# Patient Record
Sex: MALE | Race: BLACK OR AFRICAN AMERICAN | Employment: OTHER | URBAN - METROPOLITAN AREA
[De-identification: names, ages, dates, MRNs, and addresses within clinical notes are randomized per-mention and may not be internally consistent; named-entity substitution may affect disease eponyms.]

---

## 2018-10-12 ENCOUNTER — HOSPITAL ENCOUNTER (OUTPATIENT)
Facility: HOSPITAL | Age: 83
Setting detail: OBSERVATION
Discharge: HOME | End: 2018-10-13
Attending: EMERGENCY MEDICINE | Admitting: HOSPITALIST
Payer: MEDICARE

## 2018-10-12 ENCOUNTER — APPOINTMENT (OUTPATIENT)
Dept: GENERAL RADIOLOGY | Facility: HOSPITAL | Age: 83
End: 2018-10-12
Attending: EMERGENCY MEDICINE
Payer: MEDICARE

## 2018-10-12 ENCOUNTER — APPOINTMENT (OUTPATIENT)
Dept: CT IMAGING | Facility: HOSPITAL | Age: 83
End: 2018-10-12
Attending: EMERGENCY MEDICINE
Payer: MEDICARE

## 2018-10-12 DIAGNOSIS — R55 SYNCOPE AND COLLAPSE: Primary | ICD-10-CM

## 2018-10-12 PROBLEM — R79.89 AZOTEMIA: Status: ACTIVE | Noted: 2018-10-12

## 2018-10-12 PROBLEM — D69.6 THROMBOCYTOPENIA (HCC): Status: ACTIVE | Noted: 2018-10-12

## 2018-10-12 PROCEDURE — 70450 CT HEAD/BRAIN W/O DYE: CPT | Performed by: EMERGENCY MEDICINE

## 2018-10-12 PROCEDURE — 71045 X-RAY EXAM CHEST 1 VIEW: CPT | Performed by: EMERGENCY MEDICINE

## 2018-10-12 PROCEDURE — 99219 INITIAL OBSERVATION CARE,LEVL II: CPT | Performed by: HOSPITALIST

## 2018-10-12 RX ORDER — ATORVASTATIN CALCIUM 20 MG/1
20 TABLET, FILM COATED ORAL NIGHTLY
Status: DISCONTINUED | OUTPATIENT
Start: 2018-10-12 | End: 2018-10-13

## 2018-10-12 RX ORDER — SENNOSIDES 8.6 MG
650 CAPSULE ORAL EVERY 8 HOURS PRN
COMMUNITY

## 2018-10-12 RX ORDER — ATORVASTATIN CALCIUM 20 MG/1
20 TABLET, FILM COATED ORAL NIGHTLY
COMMUNITY

## 2018-10-12 RX ORDER — ALLOPURINOL 300 MG/1
300 TABLET ORAL DAILY
Status: DISCONTINUED | OUTPATIENT
Start: 2018-10-13 | End: 2018-10-13

## 2018-10-12 RX ORDER — HALOPERIDOL 5 MG/ML
1 INJECTION INTRAMUSCULAR ONCE
Status: DISCONTINUED | OUTPATIENT
Start: 2018-10-12 | End: 2018-10-12

## 2018-10-12 RX ORDER — SODIUM CHLORIDE 9 MG/ML
INJECTION, SOLUTION INTRAVENOUS CONTINUOUS
Status: ACTIVE | OUTPATIENT
Start: 2018-10-12 | End: 2018-10-12

## 2018-10-12 RX ORDER — GABAPENTIN 300 MG/1
300 CAPSULE ORAL NIGHTLY
Status: DISCONTINUED | OUTPATIENT
Start: 2018-10-12 | End: 2018-10-13

## 2018-10-12 RX ORDER — HEPARIN SODIUM 5000 [USP'U]/ML
5000 INJECTION, SOLUTION INTRAVENOUS; SUBCUTANEOUS EVERY 8 HOURS SCHEDULED
Status: DISCONTINUED | OUTPATIENT
Start: 2018-10-12 | End: 2018-10-13

## 2018-10-12 RX ORDER — ALLOPURINOL 300 MG/1
300 TABLET ORAL DAILY
COMMUNITY

## 2018-10-12 RX ORDER — TRAMADOL HYDROCHLORIDE 50 MG/1
50 TABLET ORAL EVERY 6 HOURS PRN
COMMUNITY

## 2018-10-12 RX ORDER — DEXTROSE MONOHYDRATE 25 G/50ML
50 INJECTION, SOLUTION INTRAVENOUS
Status: DISCONTINUED | OUTPATIENT
Start: 2018-10-12 | End: 2018-10-13

## 2018-10-12 RX ORDER — ASPIRIN 81 MG/1
81 TABLET, CHEWABLE ORAL DAILY
COMMUNITY

## 2018-10-12 RX ORDER — LORAZEPAM 2 MG/ML
0.5 INJECTION INTRAMUSCULAR ONCE
Status: COMPLETED | OUTPATIENT
Start: 2018-10-12 | End: 2018-10-12

## 2018-10-12 RX ORDER — TRAMADOL HYDROCHLORIDE 50 MG/1
50 TABLET ORAL EVERY 6 HOURS PRN
Status: DISCONTINUED | OUTPATIENT
Start: 2018-10-12 | End: 2018-10-13

## 2018-10-12 RX ORDER — MELATONIN
250 DAILY
Status: DISCONTINUED | OUTPATIENT
Start: 2018-10-12 | End: 2018-10-13

## 2018-10-12 RX ORDER — DONEPEZIL HYDROCHLORIDE 5 MG/1
5 TABLET, FILM COATED ORAL NIGHTLY
Status: DISCONTINUED | OUTPATIENT
Start: 2018-10-12 | End: 2018-10-13

## 2018-10-12 RX ORDER — MELATONIN
100 DAILY
Status: DISCONTINUED | OUTPATIENT
Start: 2018-10-12 | End: 2018-10-13

## 2018-10-12 RX ORDER — HALOPERIDOL 5 MG/ML
1 INJECTION INTRAMUSCULAR ONCE
Status: COMPLETED | OUTPATIENT
Start: 2018-10-12 | End: 2018-10-12

## 2018-10-12 RX ORDER — OLMESARTAN MEDOXOMIL AND HYDROCHLOROTHIAZIDE 40/12.5 40; 12.5 MG/1; MG/1
TABLET ORAL DAILY
Status: DISCONTINUED | OUTPATIENT
Start: 2018-10-12 | End: 2018-10-12 | Stop reason: ALTCHOICE

## 2018-10-12 RX ORDER — ONDANSETRON 2 MG/ML
4 INJECTION INTRAMUSCULAR; INTRAVENOUS EVERY 4 HOURS PRN
Status: DISCONTINUED | OUTPATIENT
Start: 2018-10-12 | End: 2018-10-12

## 2018-10-12 RX ORDER — QUETIAPINE 25 MG/1
25 TABLET, FILM COATED ORAL NIGHTLY PRN
Status: DISCONTINUED | OUTPATIENT
Start: 2018-10-12 | End: 2018-10-13

## 2018-10-12 RX ORDER — HALOPERIDOL 5 MG/ML
2 INJECTION INTRAMUSCULAR EVERY 6 HOURS PRN
Status: DISCONTINUED | OUTPATIENT
Start: 2018-10-12 | End: 2018-10-13

## 2018-10-12 RX ORDER — GABAPENTIN 300 MG/1
300 CAPSULE ORAL NIGHTLY
COMMUNITY

## 2018-10-12 RX ORDER — CHOLECALCIFEROL (VITAMIN D3) 125 MCG
500 CAPSULE ORAL DAILY
Status: DISCONTINUED | OUTPATIENT
Start: 2018-10-12 | End: 2018-10-13

## 2018-10-12 RX ORDER — SODIUM CHLORIDE 9 MG/ML
INJECTION, SOLUTION INTRAVENOUS ONCE
Status: COMPLETED | OUTPATIENT
Start: 2018-10-12 | End: 2018-10-12

## 2018-10-12 RX ORDER — DONEPEZIL HYDROCHLORIDE 5 MG/1
5 TABLET, FILM COATED ORAL NIGHTLY
COMMUNITY

## 2018-10-12 NOTE — PROGRESS NOTES
Received from ER into room 2210. Family at bedside. Oriented to CTU but has severe dementia and only oriented to name. Call light and phone in reach. Bed locked and in low position. Bed alarm on.

## 2018-10-12 NOTE — H&P
[facility]  History and Physical     Helena Ciara Patient Status:  Emergency    10/7/1935 MRN [id number]   Location [facility] Attending Jarod Cage MD   Hosp Day # 0 PCP Gen Butler MD     Chief Complaint: sync needed for Pain. Disp:  Rfl:    Donepezil HCl 5 MG Oral Tab Take 5 mg by mouth nightly. Disp:  Rfl:    Acetaminophen ER (TYLENOL 8 HOUR ARTHRITIS PAIN) 650 MG Oral Tab CR Take 650 mg by mouth every 8 (eight) hours as needed for Pain.  Disp:  Rfl:    gabapen 28*   CREATSERUM  1.18   GFRAA  66   GFRNAA  57*   CA  8.8   ALB  3.4   NA  139   K  4.7   CL  105   CO2  28.0   ALKPHO  108   AST  41   ALT  21   BILT  0.5   TP  7.2       Estimated Creatinine Clearance: 52.1 mL/min (based on SCr of 1.18 mg/dL).     Recent

## 2018-10-12 NOTE — ED PROVIDER NOTES
Patient Seen in: [facility] Emergency Department    History   Patient presents with:  Syncope (cardiovascular, neurologic)    Stated Complaint: syncope    HPI    Patient is a 80-year-old male with a history of diabetes, hypertension and high cholest Exam  GENERAL: Well-developed, well-nourished male sitting up breathing easily in no apparent distress. Patient is nontoxic in appearance. HEENT: Head is normocephalic, atraumatic. Pupils are 4 mm equally round and reactive to light. Oropharynx is clear. ---------                               -----------         ------                     CBC W/ DIFFERENTIAL[[id number]]          Abnormal            Final result                 Please view results for these tests on the individual orders.    SNIDER Dr. Asim Gustasfon was notified from the ER and the patient was admitted for further care at this time.           Disposition and Plan     Clinical Impression:  Syncope and collapse  (primary encounter diagnosis)    Disposition:  Admit  10/12/2018  2:47 pm    Follow

## 2018-10-12 NOTE — ED INITIAL ASSESSMENT (HPI)
Patient to ED by EMS for syncopal episode while at home today. Per EMS, patient was assisted to floor by family. Patient with hx of Dementia. Denies any complaints at this time.

## 2018-10-13 ENCOUNTER — APPOINTMENT (OUTPATIENT)
Dept: CV DIAGNOSTICS | Facility: HOSPITAL | Age: 83
End: 2018-10-13
Attending: HOSPITALIST
Payer: MEDICARE

## 2018-10-13 VITALS
DIASTOLIC BLOOD PRESSURE: 65 MMHG | HEART RATE: 71 BPM | RESPIRATION RATE: 18 BRPM | HEIGHT: 72 IN | SYSTOLIC BLOOD PRESSURE: 135 MMHG | BODY MASS INDEX: 26.95 KG/M2 | WEIGHT: 199 LBS | OXYGEN SATURATION: 99 % | TEMPERATURE: 98 F

## 2018-10-13 PROCEDURE — 99217 OBSERVATION CARE DISCHARGE: CPT | Performed by: HOSPITALIST

## 2018-10-13 PROCEDURE — 99204 OFFICE O/P NEW MOD 45 MIN: CPT | Performed by: OTHER

## 2018-10-13 PROCEDURE — 93306 TTE W/DOPPLER COMPLETE: CPT | Performed by: HOSPITALIST

## 2018-10-13 NOTE — CONSULTS
[facility]  Report of Consultation    Fern Bill Patient Status:  Observation    10/7/1935 MRN [id number]   [facility] 2NE-A Attending Hunter Julien MD   Hosp Day # 0 PCP Nestor Vogel MD     Reason for Consultation:  Fall with loss of (diabetes mellitus) ([facility] [address]    • Gout    • High blood pressure    • High cholesterol    • HTN (hypertension)    • Hypercholesterolemia    • Muscle weakness    • Visual impairment      History reviewed. No pertinent surgical history.   Family History   Problem 20 mg, Oral, Nightly  •  Donepezil HCl (ARICEPT) tab 5 mg, 5 mg, Oral, Nightly  •  gabapentin (NEURONTIN) cap 300 mg, 300 mg, Oral, Nightly  •  vitamin B-6 (pyridoxine) tab 250 mg, 250 mg, Oral, Daily  •  TraMADol HCl (ULTRAM) tab 50 mg, 50 mg, Oral, Q6H P 12 98 % — —   10/12/18 1150 128/64 97.4 °F (36.3 °C) Temporal 62 16 95 % 72\" 199 lb       Gen: well developed, well nourished, no acute distress  HEENT: normocephalic  Heart; normal E3/G1, regular rate and rhythm  Lungs: clear to auscultation bilaterally intact to pin throughout  Coord: FNF and HKS intact with minimal tremor, but no dysmetria, although he requires cues to perform this task.   He is, however able to do this, without significant ataxia; rapid alternating movements intact bilaterally  Romberg: instability as an underlying progression of chronic neurodegenerative process. He is already on cholinesterase inhibitor therapy, and has appointment with outpatient neurologist 10/22/18, at [facility], per chart review.     From what I can tell, patient blankclaude to follow up as outpatient as noted above - family aware of plan and will discuss with ancillary services.      Dario Atkins MD, Neurology  [facility]  Pager [phone]  10/13/2018

## 2018-10-13 NOTE — PROGRESS NOTES
Spoke with Dr. Scott Brennan who will see pt tomorrow morning for neurology consult.  Family plans to be at bedside per his request.

## 2018-10-13 NOTE — PLAN OF CARE
PATIENT VERY CONFUSED AND AGITATED AND PULLED IV AND MONITOR OUT AND REFUSED TO STAY IN BED AND COMBATIVE. PATIENT KICK RN 2X.  CODE SUPPORT CALLED 2X AND DR. Nahun Martinez NOTIFIED AND HALDOL 1 MG GIVEN IV AS ORDERED AND VEST AND WRIST RESTRAINT APPLIED BUT AFTER

## 2018-10-13 NOTE — PLAN OF CARE
Problem: CARDIOVASCULAR - ADULT  Goal: Maintains optimal cardiac output and hemodynamic stability  INTERVENTIONS:  - Monitor vital signs, rhythm, and trends  - Monitor for bleeding, hypotension and signs of decreased cardiac output  - Evaluate effectivenes hydration, nutrition and elimination needs   - Reorient and redirection as needed  - Assess for the need to continue restraints  Outcome: Progressing

## 2018-10-13 NOTE — CM/SW NOTE
Spoke with sister Kristopher Bravo re: d/c planning needs, patient/family decline hh services. She was interested in caregiver resources through the [facility] and even becoming a paid caregiver, left message with [employer] for additional information.

## 2018-10-13 NOTE — PHYSICAL THERAPY NOTE
Attempted to evaluate pt for physical therapy at this time, but the pt is hypoglycemic (Blood sugar=60). Will plan to re-attempt to evaluate pt when blood sugar is therapeutic.

## 2018-10-13 NOTE — PROGRESS NOTES
Discharge instructions reviewed with pt and family. Given AVS, no new prescriptions. Transport here to take pt to [facility]. Cesar Fredi for pt sent with pt and family.

## 2018-10-13 NOTE — PROGRESS NOTES
Dr. Linda Penn of 67 at 1230, treatment per protocol and repeat BS of 91 followed by meal. Epic is uncooperative.

## 2018-10-13 NOTE — PHYSICAL THERAPY NOTE
PHYSICAL THERAPY QUICK EVALUATION - INPATIENT    Room Number: 0441/0157-O  Evaluation Date: 10/13/2018  Presenting Problem: Syncope and Collapse  Physician Order: PT Eval and Treat    Problem List  Principal Problem:    Syncope and collapse  Active Probl R Elbow Flexion: 5/5   R Elbow Extension: 5/5   R Hand : 5/5    LUE ASSESSMENT   LUE AROM  L Shoulder Flexion: WFL - Within Functional Limits   L Elbow Flexion/Extension: WFL - Within Functional Limits   L Wrist Flexion: WFL - Within Functional Limit wheelchair, bedside commode, etc.): A Little   -   Moving from lying on back to sitting on the side of the bed?: A Little   How much help from another person does the patient currently need. ..   -   Moving to and from a bed to a chair (including a wheelcha for falls. Despite these impairments the pt is functioning at supervision level and is thus deemed safe for discharge home with 24 hour care, which the pt typically has from family.   Pt was issued a RW for home use this session for additional support with

## 2018-10-13 NOTE — PLAN OF CARE
Problem: CARDIOVASCULAR - ADULT  Goal: Maintains optimal cardiac output and hemodynamic stability  INTERVENTIONS:  - Monitor vital signs, rhythm, and trends  - Monitor for bleeding, hypotension and signs of decreased cardiac output  - Evaluate effectivenes condition, hydration, nutrition and elimination needs   - Reorient and redirection as needed  - Assess for the need to continue restraints  Outcome: Not Progressing

## 2018-10-13 NOTE — DISCHARGE SUMMARY
[facility]  DISCHARGE SUMMARY     Keaton Quarles Patient Status:  Observation    10/7/1935 MRN [id number]   [facility] 2NE-A Attending Nikki Thornton MD   Hosp Day # 0 PCP Fifi Tafoya MD     Date of Admission: 10/12/2018  Date of Disch Tabs  Commonly known as:  ARICEPT      Take 5 mg by mouth nightly. Refills:  0     gabapentin 300 MG Caps  Commonly known as:  NEURONTIN      Take 300 mg by mouth nightly. Refills:  0     OSTEO BI-FLEX JOINT SHIELD OR      Take 1 tablet by mouth daily.

## 2018-10-13 NOTE — PROGRESS NOTES
I have been informed by RN that patient has remained very agitated, tachy, refusing all oral meds despite 2mg of haldol IV x2. Will try a small dose of Ativan and monitor closely.      Kerline Urrutia MD  [facility]

## 2019-01-01 ENCOUNTER — APPOINTMENT (OUTPATIENT)
Dept: GENERAL RADIOLOGY | Facility: HOSPITAL | Age: 84
DRG: 683 | End: 2019-01-01
Attending: STUDENT IN AN ORGANIZED HEALTH CARE EDUCATION/TRAINING PROGRAM
Payer: MEDICARE

## 2019-01-01 ENCOUNTER — HOSPITAL ENCOUNTER (INPATIENT)
Facility: HOSPITAL | Age: 84
LOS: 5 days | Discharge: NURSING, REHAB OR LONG-TERM CARE | DRG: 683 | End: 2019-01-01
Attending: EMERGENCY MEDICINE | Admitting: HOSPITALIST
Payer: MEDICARE

## 2019-01-01 VITALS
HEIGHT: 75 IN | OXYGEN SATURATION: 91 % | RESPIRATION RATE: 20 BRPM | HEART RATE: 82 BPM | WEIGHT: 182.38 LBS | DIASTOLIC BLOOD PRESSURE: 52 MMHG | BODY MASS INDEX: 22.68 KG/M2 | TEMPERATURE: 99 F | SYSTOLIC BLOOD PRESSURE: 115 MMHG

## 2019-01-01 DIAGNOSIS — E86.0 DEHYDRATION: Primary | ICD-10-CM

## 2019-01-01 DIAGNOSIS — R19.7 DIARRHEA, UNSPECIFIED TYPE: ICD-10-CM

## 2019-01-01 DIAGNOSIS — N17.9 ACUTE RENAL FAILURE, UNSPECIFIED ACUTE RENAL FAILURE TYPE (HCC): ICD-10-CM

## 2019-01-01 PROCEDURE — 73560 X-RAY EXAM OF KNEE 1 OR 2: CPT | Performed by: STUDENT IN AN ORGANIZED HEALTH CARE EDUCATION/TRAINING PROGRAM

## 2019-01-01 PROCEDURE — 99232 SBSQ HOSP IP/OBS MODERATE 35: CPT | Performed by: HOSPITALIST

## 2019-01-01 PROCEDURE — 99239 HOSP IP/OBS DSCHRG MGMT >30: CPT | Performed by: HOSPITALIST

## 2019-01-01 PROCEDURE — 99223 1ST HOSP IP/OBS HIGH 75: CPT | Performed by: STUDENT IN AN ORGANIZED HEALTH CARE EDUCATION/TRAINING PROGRAM

## 2019-01-01 RX ORDER — DONEPEZIL HYDROCHLORIDE 5 MG/1
5 TABLET, FILM COATED ORAL NIGHTLY
Status: DISCONTINUED | OUTPATIENT
Start: 2019-01-01 | End: 2019-01-01

## 2019-01-01 RX ORDER — LOPERAMIDE HYDROCHLORIDE 2 MG/1
2 CAPSULE ORAL 4 TIMES DAILY PRN
Qty: 30 CAPSULE | Refills: 0 | Status: SHIPPED | OUTPATIENT
Start: 2019-01-01 | End: 2019-01-01

## 2019-01-01 RX ORDER — SODIUM CHLORIDE 9 MG/ML
INJECTION, SOLUTION INTRAVENOUS CONTINUOUS
Status: DISCONTINUED | OUTPATIENT
Start: 2019-01-01 | End: 2019-01-01

## 2019-01-01 RX ORDER — HALOPERIDOL 5 MG/ML
2 INJECTION INTRAMUSCULAR EVERY 4 HOURS PRN
Status: DISCONTINUED | OUTPATIENT
Start: 2019-01-01 | End: 2019-01-01

## 2019-01-01 RX ORDER — ATORVASTATIN CALCIUM 20 MG/1
20 TABLET, FILM COATED ORAL NIGHTLY
Status: DISCONTINUED | OUTPATIENT
Start: 2019-01-01 | End: 2019-01-01

## 2019-01-01 RX ORDER — LOPERAMIDE HYDROCHLORIDE 2 MG/1
2 CAPSULE ORAL 4 TIMES DAILY PRN
Qty: 30 CAPSULE | Refills: 0 | Status: SHIPPED | OUTPATIENT
Start: 2019-01-01

## 2019-01-01 RX ORDER — ASPIRIN 81 MG/1
81 TABLET, CHEWABLE ORAL DAILY
Status: DISCONTINUED | OUTPATIENT
Start: 2019-01-01 | End: 2019-01-01

## 2019-01-01 RX ORDER — POTASSIUM CHLORIDE 20 MEQ/1
40 TABLET, EXTENDED RELEASE ORAL ONCE
Status: COMPLETED | OUTPATIENT
Start: 2019-01-01 | End: 2019-01-01

## 2019-01-01 RX ORDER — HALOPERIDOL 2 MG/1
2 TABLET ORAL EVERY 6 HOURS PRN
Status: DISCONTINUED | OUTPATIENT
Start: 2019-01-01 | End: 2019-01-01

## 2019-01-01 RX ORDER — HEPARIN SODIUM 5000 [USP'U]/ML
5000 INJECTION, SOLUTION INTRAVENOUS; SUBCUTANEOUS EVERY 12 HOURS SCHEDULED
Status: DISCONTINUED | OUTPATIENT
Start: 2019-01-01 | End: 2019-01-01

## 2019-01-01 RX ORDER — METOCLOPRAMIDE HYDROCHLORIDE 5 MG/ML
10 INJECTION INTRAMUSCULAR; INTRAVENOUS EVERY 8 HOURS PRN
Status: DISCONTINUED | OUTPATIENT
Start: 2019-01-01 | End: 2019-01-01

## 2019-01-01 RX ORDER — POTASSIUM CHLORIDE 1.5 G/1.77G
40 POWDER, FOR SOLUTION ORAL ONCE
Status: COMPLETED | OUTPATIENT
Start: 2019-01-01 | End: 2019-01-01

## 2019-01-01 RX ORDER — ONDANSETRON 2 MG/ML
4 INJECTION INTRAMUSCULAR; INTRAVENOUS EVERY 4 HOURS PRN
Status: DISCONTINUED | OUTPATIENT
Start: 2019-01-01 | End: 2019-01-01 | Stop reason: HOSPADM

## 2019-01-01 RX ORDER — ONDANSETRON 2 MG/ML
4 INJECTION INTRAMUSCULAR; INTRAVENOUS EVERY 6 HOURS PRN
Status: DISCONTINUED | OUTPATIENT
Start: 2019-01-01 | End: 2019-01-01

## 2019-01-01 RX ORDER — HALOPERIDOL 5 MG/ML
5 INJECTION INTRAMUSCULAR EVERY 4 HOURS PRN
Status: DISCONTINUED | OUTPATIENT
Start: 2019-01-01 | End: 2019-01-01

## 2019-01-01 RX ORDER — TRAZODONE HYDROCHLORIDE 50 MG/1
50 TABLET ORAL NIGHTLY PRN
Status: DISCONTINUED | OUTPATIENT
Start: 2019-01-01 | End: 2019-01-01

## 2019-01-01 RX ORDER — LOPERAMIDE HYDROCHLORIDE 2 MG/1
2 CAPSULE ORAL 4 TIMES DAILY PRN
Status: DISCONTINUED | OUTPATIENT
Start: 2019-01-01 | End: 2019-01-01

## 2019-01-01 RX ORDER — ONDANSETRON 2 MG/ML
4 INJECTION INTRAMUSCULAR; INTRAVENOUS ONCE
Status: COMPLETED | OUTPATIENT
Start: 2019-01-01 | End: 2019-01-01

## 2019-01-01 RX ORDER — GABAPENTIN 300 MG/1
300 CAPSULE ORAL NIGHTLY
Status: DISCONTINUED | OUTPATIENT
Start: 2019-01-01 | End: 2019-01-01

## 2019-01-01 RX ORDER — SODIUM CHLORIDE 9 MG/ML
INJECTION, SOLUTION INTRAVENOUS CONTINUOUS
Status: ACTIVE | OUTPATIENT
Start: 2019-01-01 | End: 2019-01-01

## 2019-01-01 RX ORDER — ALLOPURINOL 300 MG/1
300 TABLET ORAL DAILY
Status: DISCONTINUED | OUTPATIENT
Start: 2019-01-01 | End: 2019-01-01

## 2019-01-01 RX ORDER — ESCITALOPRAM OXALATE 10 MG/1
10 TABLET ORAL DAILY
Status: DISCONTINUED | OUTPATIENT
Start: 2019-01-01 | End: 2019-01-01

## 2019-01-01 RX ORDER — POTASSIUM CHLORIDE 1.5 G/1.77G
40 POWDER, FOR SOLUTION ORAL EVERY 4 HOURS
Status: COMPLETED | OUTPATIENT
Start: 2019-01-01 | End: 2019-01-01

## 2019-01-01 RX ORDER — ACETAMINOPHEN 325 MG/1
650 TABLET ORAL EVERY 6 HOURS PRN
Status: DISCONTINUED | OUTPATIENT
Start: 2019-01-01 | End: 2019-01-01

## 2019-07-30 ENCOUNTER — APPOINTMENT (OUTPATIENT)
Dept: GENERAL RADIOLOGY | Facility: HOSPITAL | Age: 84
End: 2019-07-30
Attending: EMERGENCY MEDICINE
Payer: MEDICARE

## 2019-07-30 ENCOUNTER — APPOINTMENT (OUTPATIENT)
Dept: CT IMAGING | Facility: HOSPITAL | Age: 84
End: 2019-07-30
Attending: EMERGENCY MEDICINE
Payer: MEDICARE

## 2019-07-30 ENCOUNTER — HOSPITAL ENCOUNTER (EMERGENCY)
Facility: HOSPITAL | Age: 84
Discharge: HOME | End: 2019-07-30
Attending: EMERGENCY MEDICINE
Payer: MEDICARE

## 2019-07-30 VITALS
TEMPERATURE: 98 F | DIASTOLIC BLOOD PRESSURE: 68 MMHG | HEART RATE: 57 BPM | BODY MASS INDEX: 26 KG/M2 | OXYGEN SATURATION: 94 % | RESPIRATION RATE: 18 BRPM | SYSTOLIC BLOOD PRESSURE: 161 MMHG | WEIGHT: 190 LBS

## 2019-07-30 DIAGNOSIS — S80.212A ABRASION OF KNEE, BILATERAL: Primary | ICD-10-CM
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
## 2019-07-30 DIAGNOSIS — S80.211A ABRASION OF KNEE, BILATERAL: Primary | ICD-10-CM
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
## 2019-07-30 PROCEDURE — 99284 EMERGENCY DEPT VISIT MOD MDM: CPT

## 2019-07-30 PROCEDURE — 73560 X-RAY EXAM OF KNEE 1 OR 2: CPT | Performed by: EMERGENCY MEDICINE

## 2019-07-30 PROCEDURE — 72125 CT NECK SPINE W/O DYE: CPT | Performed by: EMERGENCY MEDICINE

## 2019-07-30 PROCEDURE — 70450 CT HEAD/BRAIN W/O DYE: CPT | Performed by: EMERGENCY MEDICINE

## 2019-07-30 PROCEDURE — 73030 X-RAY EXAM OF SHOULDER: CPT | Performed by: EMERGENCY MEDICINE

## 2019-07-30 RX ORDER — TRAZODONE HYDROCHLORIDE 50 MG/1
TABLET ORAL
COMMUNITY
Start: 2019-07-12

## 2019-07-30 RX ORDER — CITALOPRAM 20 MG/1
20 TABLET ORAL
COMMUNITY
Start: 2019-07-14
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
## 2019-07-30 NOTE — ED INITIAL ASSESSMENT (HPI)
Pt here due to a fall. Pt's wife heard him fall in the bathroom and she found him face down in the bathroom with urine on the floor. Pt is A0X1 which is his norm. He does not remember the fall at all.

## 2019-11-06 ENCOUNTER — APPOINTMENT (OUTPATIENT)
Dept: GENERAL RADIOLOGY | Facility: HOSPITAL | Age: 84
End: 2019-11-06
Attending: EMERGENCY MEDICINE
Payer: MEDICARE

## 2019-11-06 ENCOUNTER — HOSPITAL ENCOUNTER (EMERGENCY)
Facility: HOSPITAL | Age: 84
Discharge: HOME | End: 2019-11-06
Attending: EMERGENCY MEDICINE
Payer: MEDICARE

## 2019-11-06 VITALS
BODY MASS INDEX: 23.5 KG/M2 | TEMPERATURE: 98 F | HEIGHT: 75 IN | SYSTOLIC BLOOD PRESSURE: 122 MMHG | OXYGEN SATURATION: 97 % | DIASTOLIC BLOOD PRESSURE: 88 MMHG | HEART RATE: 88 BPM | RESPIRATION RATE: 18 BRPM | WEIGHT: 189 LBS

## 2019-11-06 DIAGNOSIS — K59.00 CONSTIPATION, UNSPECIFIED CONSTIPATION TYPE: Primary | ICD-10-CM

## 2019-11-06 PROCEDURE — 99284 EMERGENCY DEPT VISIT MOD MDM: CPT

## 2019-11-06 PROCEDURE — 74019 RADEX ABDOMEN 2 VIEWS: CPT | Performed by: EMERGENCY MEDICINE

## 2019-11-06 RX ORDER — MAGNESIUM CARB/ALUMINUM HYDROX 105-160MG
296 TABLET,CHEWABLE ORAL ONCE
Qty: 296 ML | Refills: 0 | Status: SHIPPED | OUTPATIENT
Start: 2019-11-06 | End: 2019-11-06

## 2019-11-06 NOTE — ED INITIAL ASSESSMENT (HPI)
Alert x1-2 per baseline, lives with sister and noticed pt grimacing when sitting in toilet with NO BM now x2 days, per sister, when pt wipes, fecal matter noted but no BM.

## 2019-11-06 NOTE — ED PROVIDER NOTES
Patient Seen in: [facility] Emergency Department      History   Patient presents with:  Anal Problem (GI)    Stated Complaint: rectal pain, denies bleeding or dark stool.  States some bowel incontinence    HPI    28-year-old male presents emergency r distress. HEENT:  no scleral icterus. Mucous membranes are moist  HEART: Regular rate and rhythm, no murmurs. LUNGS: Clear to auscultation bilaterally. No Rales, no rhonchi, no wheezing, no stridor.   ABDOMEN: Soft, nondistended,non tender  Rectal exam:

## 2019-12-11 PROBLEM — E86.0 DEHYDRATION: Status: ACTIVE | Noted: 2019-01-01

## 2019-12-11 PROBLEM — R19.7 DIARRHEA, UNSPECIFIED TYPE: Status: ACTIVE | Noted: 2019-01-01

## 2019-12-11 PROBLEM — N17.9 ACUTE RENAL FAILURE, UNSPECIFIED ACUTE RENAL FAILURE TYPE (HCC): Status: ACTIVE | Noted: 2019-01-01

## 2019-12-11 NOTE — ED NOTES
Incontinent of a large amount of liquid brown stool. Unable to obtain any for specimen. Repositioned in bed.

## 2019-12-11 NOTE — ED INITIAL ASSESSMENT (HPI)
Patient here with wife who reports nausea/vomiting yesterday, resolved today but developed diarrhea. Reports 6 or 7 episodes. Patient denies any abd pain. Denies fever/chills.

## 2019-12-11 NOTE — ED PROVIDER NOTES
Patient Seen in: [facility] Emergency Department      History   Patient presents with:  Nausea/Vomiting/Diarrhea    Stated Complaint: diarrhea since 530 am, cough, no fever.  no abd pain    HPI    80-year-old male with history of diabetes, hypertensi 97.3 °F (36.3 °C) (Temporal)   Resp 18   Ht 190.5 cm (6' 3\")   Wt 89.8 kg   SpO2 99%   BMI 24.75 kg/m²         Physical Exam    GENERAL: Patient is awake, alert,  in no acute distress. HEENT:  no scleral icterus.   Mucous membranes are moderately dry, true CULTURE REFLEX   RAINBOW DRAW BLUE   RAINBOW DRAW LAVENDER   RAINBOW DRAW LIGHT GREEN   RAINBOW DRAW GOLD   C. DIFFICILE(TOXIGENIC)PCR   OCCULT BLOOD, STOOL   STOOL CULTURE W/SHIGATOXIN                  MDM     Patient IV established, blood work obtained.

## 2019-12-12 NOTE — PROGRESS NOTES
[facility]  Progress Note     Maegan Salgado Patient Status:  Inpatient    10/7/1935 MRN [id number]   [facility] 5NW-A Attending Trice Fontaine MD   Hosp Day # 1 PCP El Tanner MD     Chief Complaint: n/v/d  S:  Agitated overnigh per day     ASSESSMENT / PLAN:   1. Diarrhea- C dif neg  1. Stool studies pending   2. IVF  3. Supportive care  4. Imodium PRN   2. Right knee pain- Xray neg. Pt w/ h/o gout  1. On allopurinol  2. Start prednisone short burst   3. SHAMIKA- improving  1.  IVF  2

## 2019-12-12 NOTE — PLAN OF CARE
Admission navigator completed by admission RN. Admitted in er. Report given to accepting RN. Pt lives at home with sisters who take care of him.  24/7

## 2019-12-12 NOTE — PLAN OF CARE
12/11 2000: Pt received AOx1, confused. Room air. Denies pain. Contact and Droplet Isolation r/o flu and c-diff. Pt's sister Minna Em at bedside and stated pt does not sleep much at night and she has to stay awake to keep him safe.    2315: Pt confused an values  - Obtain nutritional consult as needed  - Optimize oral hygiene and moisture  - Encourage food from home; allow for food preferences  - Enhance eating environment  Outcome: Progressing     Problem: METABOLIC/FLUID AND ELECTROLYTES - ADULT  Goal: El Encourage and assist patient to increase activity and self care with guidance from PT/OT  - Encourage visually impaired, hearing impaired and aphasic patients to use assistive/communication devices  Outcome: Progressing     Problem: Impaired Activities of

## 2019-12-12 NOTE — PLAN OF CARE
Patient is alert, oriented to self. Agitated/pleasantly confused attempting to DC medical interventions, restraints in place. IVF infusing. Patient with occasional LBMs (c diff negative), PRN imodium administered. Incontinent of bowel/bladder.  Pills whole interventions  Outcome: Progressing  Goal: Patient/Family Short Term Goal  Description  Patient's Short Term Goal:     Interventions:   - See additional Care Plan goals for specific interventions  Outcome: Progressing     Problem: Safety Risk - Non-Violent rhythm and assess patient for signs and symptoms of electrolyte imbalances  - Administer electrolyte replacement as ordered  - Monitor response to electrolyte replacements, including rhythm and repeat lab results as appropriate  - Fluid restriction as orde care  Description  Interventions:  - Assess ability and encourage patient to participate in ADLs to maximize function  - Promote sitting position while performing ADLs such as feeding, grooming, and bathing  - Educate and encourage patient/family in [place]

## 2019-12-12 NOTE — H&P
[facility]  History and Physical     [facility] Patient Status:  Emergency    10/7/1935 MRN [id number]   Location [facility] Attending Pritesh Cooper DO   Hosp Day # 0 PCP Luis F Swartz MD     Chief Complaint: diarrhea needed for Pain., Disp: , Rfl:   Donepezil HCl 5 MG Oral Tab, Take 5 mg by mouth nightly., Disp: , Rfl:   Acetaminophen ER (TYLENOL 8 HOUR ARTHRITIS PAIN) 650 MG Oral Tab CR, Take 650 mg by mouth every 8 (eight) hours as needed for Pain., Disp: , Rfl:   ga BUN 61*   CREATSERUM 2.06*   GFRAA 33*   GFRNAA 29*   CA 8.8   ALB 3.1*      K 3.6      CO2 29.0   ALKPHO 103   AST 28   ALT 24   BILT 0.4   TP 7.9       Estimated Creatinine Clearance: 31.9 mL/min (A) (based on SCr of 2.06 mg/dL (H)).     No

## 2019-12-13 NOTE — PHYSICAL THERAPY NOTE
PHYSICAL THERAPY EVALUATION - INPATIENT     Room Number: 408/535-Y  Evaluation Date: 12/13/2019  Type of Evaluation: Initial  Physician Order: PT Eval and Treat    Presenting Problem: Dehydration, ARF, diarrhea  Reason for Therapy: Mobility Dysfuncti supervision/assist    SUBJECTIVE  \"Oh hi there\"    Patient self-stated goal is n/a    OBJECTIVE  Precautions: Restraints;Hard of hearing  Fall Risk: High fall risk    WEIGHT BEARING RESTRICTION  Weight Bearing Restriction: None                PAIN ASSESS wheelchair)?: A Lot   -   Need to walk in hospital room?: A Lot   -   Climbing 3-5 steps with a railing?: Total       AM-PAC Score:  Raw Score: 11   Approx Degree of Impairment: 72.57%   Standardized Score (AM-PAC Scale): 33.86   CMS Modifier (G-Code): CL demonstrating a 72.57% degree of impairment in mobility. Based on this evaluation, patient's clinical presentation is stable and overall the evaluation complexity is considered low.   These impairments and comorbidities manifest themselves as functional limi

## 2019-12-13 NOTE — PLAN OF CARE
Pt A&Ox1. O2 sats WNL on room air. Not on tele. Tolerating diet. Restraints in place. Upx1 with walker. Takes pills whole with apple sauce. Pt keeps trying to take off restraints. VSS. WCTM. IV fluids. Safety precautions in place.      Problem: Diabetes/Glu function  Description  INTERVENTIONS:  - Assess bowel function  - Maintain adequate hydration with IV or PO as ordered and tolerated  - Evaluate effectiveness of GI medications  - Encourage mobilization and activity  - Obtain nutritional consult as needed Progressing     Problem: NEUROLOGICAL - ADULT  Goal: Achieves stable or improved neurological status  Description  INTERVENTIONS  - Assess for and report changes in neurological status  - Initiate measures to prevent increased intracranial pressure  - Main

## 2019-12-13 NOTE — PROGRESS NOTES
[facility]  Progress Note     Aaron Levin Patient Status:  Inpatient    10/7/1935 MRN [id number]   [facility] 5NW-A Attending Hudson Mathias MD   Hosp Day # 2 PCP Keri Metz MD     Chief Complaint: n/v/d  S:  Loose stools bett Daily   • Donepezil HCl  5 mg Oral Nightly   • gabapentin  300 mg Oral Nightly   • Heparin Sodium (Porcine)  5,000 Units Subcutaneous 2 times per day     ASSESSMENT / PLAN:   1. Diarrhea- C dif neg  1. Stool studies pending   2. IVF, Supportive care  3.  Im

## 2019-12-13 NOTE — CM/SW NOTE
Patient discussed in rounds, anticipate d/c today. Page out to PT for eval. Patient lives home w/sister.     Mira Cortez RN,   Phone [phone]

## 2019-12-13 NOTE — CM/SW NOTE
PT=NIKKI, spoke with sister at bedside, agrees with nikki recommendation, patient is not at his baseline, currently in restraints. Would like referral to [facility], don called, dq completed. Updated rn on d/c plan.     Brendan Ferro, RN,   Ph

## 2019-12-14 NOTE — CM/SW NOTE
Updates sent to [facility], referral is still pending. Patient has been restraint free since yesterday at 1800. Await response.     General Abdullahi RN,   Phone [phone]

## 2019-12-14 NOTE — PLAN OF CARE
Problem: Diabetes/Glucose Control  Goal: Glucose maintained within prescribed range  Description  INTERVENTIONS:  - Monitor Blood Glucose as ordered  - Assess for signs and symptoms of hyperglycemia and hypoglycemia  - Administer ordered medications to m activity  - Obtain nutritional consult as needed  - Establish a toileting routine/schedule  - Consider collaborating with pharmacy to review patient's medication profile  Outcome: Progressing  Goal: Maintains adequate nutritional intake (undernourished)  D prevent increased intracranial pressure  - Maintain blood pressure and fluid volume within ordered parameters to optimize cerebral perfusion and minimize risk of hemorrhage  - Monitor temperature, glucose, and sodium.  Initiate appropriate interventions as updated on POC, hourly rounding, WCTM.

## 2019-12-14 NOTE — PROGRESS NOTES
[facility]  Progress Note     Luis F Corkycammie Patient Status:  Inpatient    10/7/1935 MRN [id number]   [facility] 5NW-A Attending Vee Mendoza MD   Hosp Day # 3 PCP Getachew Larkin MD     Chief Complaint: n/v/d  S:  Loose stools bett Daily   • Donepezil HCl  5 mg Oral Nightly   • gabapentin  300 mg Oral Nightly   • Heparin Sodium (Porcine)  5,000 Units Subcutaneous 2 times per day     ASSESSMENT / PLAN:   1. Diarrhea- C dif neg  1. Stool studies pending   2. IVF, Supportive care  3.  Im

## 2019-12-15 NOTE — PROGRESS NOTES
updated on patient's need for Posey vest.   would like to know if SW is available to look into a hospital bed for patient when patient returns home after PEPPER. Will relay to day nurse.

## 2019-12-15 NOTE — CM/SW NOTE
SW spoke to RN-pt is medically cleared but is in restraints- pt needs to be restraint free for 24 hours. Will follow up with Mc on Monday.     Magdiel Fortune LCSW  /Discharge Planner  [phone]

## 2019-12-15 NOTE — PLAN OF CARE
Problem: Diabetes/Glucose Control  Goal: Glucose maintained within prescribed range  Description  INTERVENTIONS:  - Monitor Blood Glucose as ordered  - Assess for signs and symptoms of hyperglycemia and hypoglycemia  - Administer ordered medications to m medications  - Encourage mobilization and activity  - Obtain nutritional consult as needed  - Establish a toileting routine/schedule  - Consider collaborating with pharmacy to review patient's medication profile  Outcome: Progressing  Goal: Maintains adequ neurological status  - Initiate measures to prevent increased intracranial pressure  - Maintain blood pressure and fluid volume within ordered parameters to optimize cerebral perfusion and minimize risk of hemorrhage  - Monitor temperature, glucose, and so appropriate. Walt neal obtained, pt and family updated on POC. Call light within reach, hourly rounding, [address]

## 2019-12-15 NOTE — OCCUPATIONAL THERAPY NOTE
OCCUPATIONAL THERAPY EVALUATION - INPATIENT     Room Number: 048/416-I  Evaluation Date: 12/15/2019  Type of Evaluation: Initial       Physician Order: IP Consult to Occupational Therapy  Reason for Therapy: ADL/IADL Dysfunction and Discharge Planning    H RESTRICTION  Weight Bearing Restriction: None                PAIN ASSESSMENT  Ratin  Location: n/a       COGNITION  Orientation Level:  oriented to person, disoriented to place, disoriented to time and disoriented to situation  Following Commands:  fol session and discussion. Education on role of OT and plan of care. Pt was calm and relaxed in bed upon arrival w/o restraints.   Education and assessment of supine to sit transfer (mod assist for BLE and trunk with HOB up and use of rails), sit-stand trans this period of rehabilitation patient should achieve sup-min assist level in basic ADLs.       Patient Complexity  Occupational Profile/Medical History LOW - Brief history including review of medical or therapy records    Specific performance deficits impac

## 2019-12-15 NOTE — PROGRESS NOTES
[facility]  Progress Note     Grupo Barillas Patient Status:  Inpatient    10/7/1935 MRN [id number]   [facility] 5NW-A Attending Kendall Zhu MD   Hosp Day # 4 PCP Luis F Swartz MD     Chief Complaint: n/v/d  S:  Loose stools bett Daily   • Donepezil HCl  5 mg Oral Nightly   • gabapentin  300 mg Oral Nightly   • Heparin Sodium (Porcine)  5,000 Units Subcutaneous 2 times per day     ASSESSMENT / PLAN:   1. Diarrhea- C dif neg  1. Stool studies neg   2. IVF, Supportive care  3.  Imodiu

## 2019-12-15 NOTE — PLAN OF CARE
Problem: Diarrhea     Data: Patient alert and oriented to self only per baseline. Patient confused, unsafe at times, PRN Haldol utilize per orders. Patient denies pain. Appears to be without discomfort. SPO2 remains greater then 92% room air.  Lung sounds d medical devices as soon as possible  - Assess the patient's physical comfort, circulation, skin condition, hydration, nutrition and elimination needs   - Reorient and redirection as needed  - Assess for the need to continue restraints  Outcome: Progressing specific gravity, serum osmolarity and serum sodium as indicated or ordered  - Monitor response to interventions for patient's volume status, including labs, urine output, blood pressure (other measures as available)  - Encourage oral intake as appropriate patient/family in tolerated activity level and precautions  - Recommend use of  RW for transfers and ambulation   Outcome: Progressing

## 2019-12-16 NOTE — CM/SW NOTE
MSW arranged [facility] for 5pm  per request of [employer]     [facility]

## 2019-12-16 NOTE — PROGRESS NOTES
[facility]ITAL[facility]  Progress Note     Maegan Salgado Patient Status:  Inpatient    10/7/1935 MRN [id number]   [facility] 5NW-A Attending Trice Fontaine MD   Hosp Day # 5 PCP El Tanner MD     Chief Complaint: n/v/d  S:  No overnight even 5 mg Oral Nightly   • gabapentin  300 mg Oral Nightly   • Heparin Sodium (Porcine)  5,000 Units Subcutaneous 2 times per day     ASSESSMENT / PLAN:   1. Diarrhea- C dif neg  1. Stool studies neg   2. IVF, Supportive care  3. Imodium PRN   2.  Right knee priscilla

## 2019-12-16 NOTE — PLAN OF CARE
Problem: Patient/Family Goals  Goal: Patient/Family Long Term Goal  Description  Patient's Long Term Goal: Return back to home safely    Interventions:  - PT/OT consult  - See additional Care Plan goals for specific interventions   Outcome: Progressing [] Medium     [x] High      Anticipated discharge date: 12/16/19    Current discharge plan: PEPPER Marc Cleveland    F/U appointment scheduled within 7 days. ..      [] [facility] ([facility])     [] Pulmonologist     [x] Primary Care Physician     []

## 2019-12-16 NOTE — CM/SW NOTE
MSW sent updates to [facility] via [facility] and spoke with Willian Hurst in admissions. They are still reviewing the patient as they have medical questions about the dementia with behavior disturbance and use of Haldol.   MSW placed [employer] on will call an

## 2019-12-16 NOTE — PLAN OF CARE
Assumed patient care at 0730. Vital signs stable. Patient alert but confused. Patient seems to be pain free but does not answer question. Patient mumbles and seems to be talking nonsense.   Weston vest removed at 1330 - attempting to remain restraint fr

## 2019-12-16 NOTE — PROGRESS NOTES
Attempted to call Yarelis of [employer] for report. RN unavailable and will call back for report.

## 2019-12-16 NOTE — PLAN OF CARE
Problem: Diabetes/Glucose Control  Goal: Glucose maintained within prescribed range  Description  INTERVENTIONS:  - Monitor Blood Glucose as ordered  - Assess for signs and symptoms of hyperglycemia and hypoglycemia  - Administer ordered medications to m medications  - Encourage mobilization and activity  - Obtain nutritional consult as needed  - Establish a toileting routine/schedule  - Consider collaborating with pharmacy to review patient's medication profile  Outcome: Progressing  Goal: Maintains adequ neurological status  - Initiate measures to prevent increased intracranial pressure  - Maintain blood pressure and fluid volume within ordered parameters to optimize cerebral perfusion and minimize risk of hemorrhage  - Monitor temperature, glucose, and so

## 2019-12-16 NOTE — PROGRESS NOTES
Attempted to call [employer] regarding scheduled 5pm transport time, as pt has not yet been picked up - on hold for 10 minutes. Will re-attempt to call.

## 2019-12-17 NOTE — DISCHARGE SUMMARY
[facility] HOSPITAL[facility]  DISCHARGE SUMMARY     Grupo Barillas Patient Status:  Inpatient    10/7/1935 MRN [id number]   [facility] 5NW-A Attending No att. providers found   Hosp Day # 5 PCP Luis F Swartz MD     Date of Admission: 2019  Date of symptoms improved with supportive care. Patient was given Imodium with significant improvement in diarrhea. Did have right knee pain which imaging was unremarkable. He had a history of gout. He was continued on allopurinol.   There is consideration of p every 6 (six) hours as needed for Pain.    Refills:  0     traZODone HCl 50 MG Tabs  Commonly known as:  DESYREL      Take 50 mg at night as needed for insomnia   Refills:  0     TYLENOL 8 HOUR ARTHRITIS PAIN 650 MG Tbcr  Generic drug:  Acetaminophen ER

## 2019-12-17 NOTE — PROGRESS NOTES
NURSING DISCHARGE NOTE    Discharged Rehab facility via Ambulance. Accompanied by Support staff  Belongings Taken by patient/family. Patient awake with stable vitals. Patient IV removed.

## 2020-01-01 ENCOUNTER — APPOINTMENT (OUTPATIENT)
Dept: CV DIAGNOSTICS | Facility: HOSPITAL | Age: 85
DRG: 871 | End: 2020-01-01
Attending: INTERNAL MEDICINE
Payer: MEDICARE

## 2020-01-01 ENCOUNTER — HOSPITAL ENCOUNTER (INPATIENT)
Facility: HOSPITAL | Age: 85
LOS: 10 days | Discharge: HOSPICE | DRG: 871 | End: 2020-01-01
Attending: EMERGENCY MEDICINE | Admitting: HOSPITALIST
Payer: MEDICARE

## 2020-01-01 ENCOUNTER — APPOINTMENT (OUTPATIENT)
Dept: CT IMAGING | Facility: HOSPITAL | Age: 85
End: 2020-01-01
Attending: EMERGENCY MEDICINE
Payer: MEDICARE

## 2020-01-01 ENCOUNTER — APPOINTMENT (OUTPATIENT)
Dept: GENERAL RADIOLOGY | Facility: HOSPITAL | Age: 85
DRG: 871 | End: 2020-01-01
Attending: EMERGENCY MEDICINE
Payer: MEDICARE

## 2020-01-01 ENCOUNTER — HOSPITAL ENCOUNTER (OUTPATIENT)
Facility: HOSPITAL | Age: 85
Setting detail: OBSERVATION
Discharge: HOME HEALTH | End: 2020-01-01
Attending: EMERGENCY MEDICINE | Admitting: HOSPITALIST
Payer: MEDICARE

## 2020-01-01 ENCOUNTER — APPOINTMENT (OUTPATIENT)
Dept: GENERAL RADIOLOGY | Facility: HOSPITAL | Age: 85
DRG: 871 | End: 2020-01-01
Attending: NURSE PRACTITIONER
Payer: MEDICARE

## 2020-01-01 ENCOUNTER — APPOINTMENT (OUTPATIENT)
Dept: CT IMAGING | Facility: HOSPITAL | Age: 85
DRG: 871 | End: 2020-01-01
Attending: HOSPITALIST
Payer: MEDICARE

## 2020-01-01 ENCOUNTER — APPOINTMENT (OUTPATIENT)
Dept: GENERAL RADIOLOGY | Facility: HOSPITAL | Age: 85
End: 2020-01-01
Attending: EMERGENCY MEDICINE
Payer: MEDICARE

## 2020-01-01 ENCOUNTER — APPOINTMENT (OUTPATIENT)
Dept: GENERAL RADIOLOGY | Facility: HOSPITAL | Age: 85
DRG: 871 | End: 2020-01-01
Attending: HOSPITALIST
Payer: MEDICARE

## 2020-01-01 VITALS
BODY MASS INDEX: 18 KG/M2 | WEIGHT: 142.88 LBS | TEMPERATURE: 98 F | RESPIRATION RATE: 16 BRPM | DIASTOLIC BLOOD PRESSURE: 65 MMHG | SYSTOLIC BLOOD PRESSURE: 136 MMHG | HEART RATE: 59 BPM | OXYGEN SATURATION: 100 %

## 2020-01-01 VITALS
WEIGHT: 183 LBS | SYSTOLIC BLOOD PRESSURE: 133 MMHG | DIASTOLIC BLOOD PRESSURE: 63 MMHG | TEMPERATURE: 98 F | HEART RATE: 66 BPM | RESPIRATION RATE: 18 BRPM | OXYGEN SATURATION: 100 % | BODY MASS INDEX: 23 KG/M2

## 2020-01-01 DIAGNOSIS — A41.9 SEPSIS DUE TO PNEUMONIA (HCC): Primary | ICD-10-CM

## 2020-01-01 DIAGNOSIS — A41.9 SEPSIS WITH HYPOTENSION (HCC): ICD-10-CM

## 2020-01-01 DIAGNOSIS — R00.1 SYMPTOMATIC BRADYCARDIA: ICD-10-CM

## 2020-01-01 DIAGNOSIS — R41.82 ALTERED MENTAL STATUS, UNSPECIFIED ALTERED MENTAL STATUS TYPE: Primary | ICD-10-CM

## 2020-01-01 DIAGNOSIS — J96.00 ACUTE RESPIRATORY FAILURE, UNSPECIFIED WHETHER WITH HYPOXIA OR HYPERCAPNIA (HCC): ICD-10-CM

## 2020-01-01 DIAGNOSIS — I95.9 SEPSIS WITH HYPOTENSION (HCC): ICD-10-CM

## 2020-01-01 DIAGNOSIS — J18.9 SEPSIS DUE TO PNEUMONIA (HCC): Primary | ICD-10-CM

## 2020-01-01 PROCEDURE — 70450 CT HEAD/BRAIN W/O DYE: CPT | Performed by: EMERGENCY MEDICINE

## 2020-01-01 PROCEDURE — 70450 CT HEAD/BRAIN W/O DYE: CPT | Performed by: HOSPITALIST

## 2020-01-01 PROCEDURE — 0BH17EZ INSERTION OF ENDOTRACHEAL AIRWAY INTO TRACHEA, VIA NATURAL OR ARTIFICIAL OPENING: ICD-10-PCS | Performed by: EMERGENCY MEDICINE

## 2020-01-01 PROCEDURE — 99231 SBSQ HOSP IP/OBS SF/LOW 25: CPT | Performed by: NURSE PRACTITIONER

## 2020-01-01 PROCEDURE — 99233 SBSQ HOSP IP/OBS HIGH 50: CPT | Performed by: INTERNAL MEDICINE

## 2020-01-01 PROCEDURE — 5A09357 ASSISTANCE WITH RESPIRATORY VENTILATION, LESS THAN 24 CONSECUTIVE HOURS, CONTINUOUS POSITIVE AIRWAY PRESSURE: ICD-10-PCS | Performed by: EMERGENCY MEDICINE

## 2020-01-01 PROCEDURE — 99239 HOSP IP/OBS DSCHRG MGMT >30: CPT | Performed by: INTERNAL MEDICINE

## 2020-01-01 PROCEDURE — 99217 OBSERVATION CARE DISCHARGE: CPT | Performed by: INTERNAL MEDICINE

## 2020-01-01 PROCEDURE — 99223 1ST HOSP IP/OBS HIGH 75: CPT | Performed by: HOSPITALIST

## 2020-01-01 PROCEDURE — 93306 TTE W/DOPPLER COMPLETE: CPT | Performed by: INTERNAL MEDICINE

## 2020-01-01 PROCEDURE — 99232 SBSQ HOSP IP/OBS MODERATE 35: CPT | Performed by: HOSPITALIST

## 2020-01-01 PROCEDURE — 99221 1ST HOSP IP/OBS SF/LOW 40: CPT | Performed by: NURSE PRACTITIONER

## 2020-01-01 PROCEDURE — 36620 INSERTION CATHETER ARTERY: CPT | Performed by: NURSE PRACTITIONER

## 2020-01-01 PROCEDURE — 99219 INITIAL OBSERVATION CARE,LEVL II: CPT | Performed by: HOSPITALIST

## 2020-01-01 PROCEDURE — 5A1945Z RESPIRATORY VENTILATION, 24-96 CONSECUTIVE HOURS: ICD-10-PCS | Performed by: EMERGENCY MEDICINE

## 2020-01-01 PROCEDURE — 74230 X-RAY XM SWLNG FUNCJ C+: CPT | Performed by: HOSPITALIST

## 2020-01-01 PROCEDURE — 71045 X-RAY EXAM CHEST 1 VIEW: CPT | Performed by: EMERGENCY MEDICINE

## 2020-01-01 PROCEDURE — 02HV33Z INSERTION OF INFUSION DEVICE INTO SUPERIOR VENA CAVA, PERCUTANEOUS APPROACH: ICD-10-PCS | Performed by: INTERNAL MEDICINE

## 2020-01-01 PROCEDURE — 95816 EEG AWAKE AND DROWSY: CPT | Performed by: OTHER

## 2020-01-01 PROCEDURE — 99356 PROLONGED SERV,INPATIENT,1ST HR: CPT | Performed by: NURSE PRACTITIONER

## 2020-01-01 PROCEDURE — 99233 SBSQ HOSP IP/OBS HIGH 50: CPT | Performed by: NURSE PRACTITIONER

## 2020-01-01 PROCEDURE — B548ZZA ULTRASONOGRAPHY OF SUPERIOR VENA CAVA, GUIDANCE: ICD-10-PCS | Performed by: INTERNAL MEDICINE

## 2020-01-01 PROCEDURE — 3E033XZ INTRODUCTION OF VASOPRESSOR INTO PERIPHERAL VEIN, PERCUTANEOUS APPROACH: ICD-10-PCS | Performed by: EMERGENCY MEDICINE

## 2020-01-01 PROCEDURE — 99232 SBSQ HOSP IP/OBS MODERATE 35: CPT | Performed by: INTERNAL MEDICINE

## 2020-01-01 PROCEDURE — 71045 X-RAY EXAM CHEST 1 VIEW: CPT | Performed by: NURSE PRACTITIONER

## 2020-01-01 RX ORDER — ACETAMINOPHEN 325 MG/1
650 TABLET ORAL EVERY 6 HOURS PRN
Status: DISCONTINUED | OUTPATIENT
Start: 2020-01-01 | End: 2020-01-01

## 2020-01-01 RX ORDER — SODIUM CHLORIDE 9 MG/ML
INJECTION, SOLUTION INTRAVENOUS CONTINUOUS
Status: DISCONTINUED | OUTPATIENT
Start: 2020-01-01 | End: 2020-01-01

## 2020-01-01 RX ORDER — HEPARIN SODIUM 5000 [USP'U]/ML
5000 INJECTION, SOLUTION INTRAVENOUS; SUBCUTANEOUS EVERY 8 HOURS SCHEDULED
Status: DISCONTINUED | OUTPATIENT
Start: 2020-01-01 | End: 2020-01-01

## 2020-01-01 RX ORDER — POLYETHYLENE GLYCOL 3350 17 G/17G
17 POWDER, FOR SOLUTION ORAL DAILY PRN
Status: DISCONTINUED | OUTPATIENT
Start: 2020-01-01 | End: 2020-01-01

## 2020-01-01 RX ORDER — ASPIRIN 81 MG/1
81 TABLET, CHEWABLE ORAL DAILY
Status: DISCONTINUED | OUTPATIENT
Start: 2020-01-01 | End: 2020-01-01

## 2020-01-01 RX ORDER — MAGNESIUM OXIDE 400 MG (241.3 MG MAGNESIUM) TABLET
400 TABLET ONCE
Status: COMPLETED | OUTPATIENT
Start: 2020-01-01 | End: 2020-01-01

## 2020-01-01 RX ORDER — NOREPINEPHRINE BIT/0.9 % NACL 8 MG/250ML
INFUSION BOTTLE (ML) INTRAVENOUS CONTINUOUS
Status: DISCONTINUED | OUTPATIENT
Start: 2020-01-01 | End: 2020-01-01

## 2020-01-01 RX ORDER — POTASSIUM CHLORIDE 1.5 G/1.77G
40 POWDER, FOR SOLUTION ORAL ONCE
Status: COMPLETED | OUTPATIENT
Start: 2020-01-01 | End: 2020-01-01

## 2020-01-01 RX ORDER — ACETAMINOPHEN 650 MG/1
650 SUPPOSITORY RECTAL EVERY 6 HOURS PRN
Status: DISCONTINUED | OUTPATIENT
Start: 2020-01-01 | End: 2020-01-01

## 2020-01-01 RX ORDER — ONDANSETRON 2 MG/ML
4 INJECTION INTRAMUSCULAR; INTRAVENOUS EVERY 4 HOURS PRN
Status: DISCONTINUED | OUTPATIENT
Start: 2020-01-01 | End: 2020-01-01

## 2020-01-01 RX ORDER — POTASSIUM CHLORIDE 29.8 MG/ML
40 INJECTION INTRAVENOUS ONCE
Status: COMPLETED | OUTPATIENT
Start: 2020-01-01 | End: 2020-01-01

## 2020-01-01 RX ORDER — POTASSIUM CHLORIDE 20 MEQ/1
40 TABLET, EXTENDED RELEASE ORAL ONCE
Status: COMPLETED | OUTPATIENT
Start: 2020-01-01 | End: 2020-01-01

## 2020-01-01 RX ORDER — FAMOTIDINE 20 MG/1
20 TABLET ORAL 2 TIMES DAILY
Status: DISCONTINUED | OUTPATIENT
Start: 2020-01-01 | End: 2020-01-01 | Stop reason: ALTCHOICE

## 2020-01-01 RX ORDER — BISACODYL 10 MG
10 SUPPOSITORY, RECTAL RECTAL
Status: DISCONTINUED | OUTPATIENT
Start: 2020-01-01 | End: 2020-01-01

## 2020-01-01 RX ORDER — SODIUM CHLORIDE, SODIUM LACTATE, POTASSIUM CHLORIDE, CALCIUM CHLORIDE 600; 310; 30; 20 MG/100ML; MG/100ML; MG/100ML; MG/100ML
INJECTION, SOLUTION INTRAVENOUS CONTINUOUS
Status: DISCONTINUED | OUTPATIENT
Start: 2020-01-01 | End: 2020-01-01

## 2020-01-01 RX ORDER — ETOMIDATE 2 MG/ML
INJECTION INTRAVENOUS
Status: COMPLETED | OUTPATIENT
Start: 2020-01-01 | End: 2020-01-01

## 2020-01-01 RX ORDER — ONDANSETRON 2 MG/ML
4 INJECTION INTRAMUSCULAR; INTRAVENOUS EVERY 6 HOURS PRN
Status: DISCONTINUED | OUTPATIENT
Start: 2020-01-01 | End: 2020-01-01

## 2020-01-01 RX ORDER — ESCITALOPRAM OXALATE 10 MG/1
10 TABLET ORAL DAILY
Status: DISCONTINUED | OUTPATIENT
Start: 2020-01-01 | End: 2020-01-01

## 2020-01-01 RX ORDER — DEXTROSE MONOHYDRATE 25 G/50ML
50 INJECTION, SOLUTION INTRAVENOUS
Status: DISCONTINUED | OUTPATIENT
Start: 2020-01-01 | End: 2020-01-01

## 2020-01-01 RX ORDER — POTASSIUM CHLORIDE 1.5 G/1.77G
40 POWDER, FOR SOLUTION ORAL EVERY 4 HOURS
Status: COMPLETED | OUTPATIENT
Start: 2020-01-01 | End: 2020-01-01

## 2020-01-01 RX ORDER — FAMOTIDINE 10 MG/ML
20 INJECTION, SOLUTION INTRAVENOUS 2 TIMES DAILY
Status: DISCONTINUED | OUTPATIENT
Start: 2020-01-01 | End: 2020-01-01 | Stop reason: ALTCHOICE

## 2020-01-01 RX ORDER — METOCLOPRAMIDE HYDROCHLORIDE 5 MG/ML
5 INJECTION INTRAMUSCULAR; INTRAVENOUS EVERY 8 HOURS PRN
Status: DISCONTINUED | OUTPATIENT
Start: 2020-01-01 | End: 2020-01-01

## 2020-01-01 RX ORDER — FAMOTIDINE 20 MG/1
20 TABLET ORAL DAILY
Status: DISCONTINUED | OUTPATIENT
Start: 2020-01-01 | End: 2020-01-01

## 2020-01-01 RX ORDER — LIDOCAINE HYDROCHLORIDE 20 MG/ML
10 JELLY TOPICAL ONCE
Status: COMPLETED | OUTPATIENT
Start: 2020-01-01 | End: 2020-01-01

## 2020-01-01 RX ORDER — SODIUM CHLORIDE 9 MG/ML
INJECTION, SOLUTION INTRAVENOUS CONTINUOUS
Status: ACTIVE | OUTPATIENT
Start: 2020-01-01 | End: 2020-01-01

## 2020-01-01 RX ORDER — LIDOCAINE HYDROCHLORIDE 20 MG/ML
JELLY TOPICAL
Status: COMPLETED
Start: 2020-01-01 | End: 2020-01-01

## 2020-01-01 RX ORDER — FAMOTIDINE 10 MG/ML
20 INJECTION, SOLUTION INTRAVENOUS DAILY
Status: DISCONTINUED | OUTPATIENT
Start: 2020-01-01 | End: 2020-01-01

## 2020-01-01 RX ORDER — PHENYLEPHRINE HCL IN 0.9% NACL 50MG/250ML
PLASTIC BAG, INJECTION (ML) INTRAVENOUS CONTINUOUS
Status: DISCONTINUED | OUTPATIENT
Start: 2020-01-01 | End: 2020-01-01

## 2020-01-01 RX ORDER — ATORVASTATIN CALCIUM 20 MG/1
20 TABLET, FILM COATED ORAL NIGHTLY
Status: DISCONTINUED | OUTPATIENT
Start: 2020-01-01 | End: 2020-01-01

## 2020-01-01 RX ORDER — DEXTROSE AND SODIUM CHLORIDE 5; .45 G/100ML; G/100ML
INJECTION, SOLUTION INTRAVENOUS CONTINUOUS
Status: DISCONTINUED | OUTPATIENT
Start: 2020-01-01 | End: 2020-01-01

## 2020-01-01 RX ORDER — DEXMEDETOMIDINE HYDROCHLORIDE 4 UG/ML
INJECTION, SOLUTION INTRAVENOUS
Status: DISPENSED
Start: 2020-01-01 | End: 2020-01-01

## 2020-01-01 RX ORDER — DEXTROSE MONOHYDRATE 25 G/50ML
INJECTION, SOLUTION INTRAVENOUS
Status: COMPLETED
Start: 2020-01-01 | End: 2020-01-01

## 2020-01-01 RX ORDER — GABAPENTIN 300 MG/1
300 CAPSULE ORAL NIGHTLY
Status: DISCONTINUED | OUTPATIENT
Start: 2020-01-01 | End: 2020-01-01

## 2020-01-01 RX ORDER — CHLORHEXIDINE GLUCONATE 0.12 MG/ML
15 RINSE ORAL
Status: DISCONTINUED | OUTPATIENT
Start: 2020-01-01 | End: 2020-01-01

## 2020-01-01 RX ORDER — SODIUM PHOSPHATE, DIBASIC AND SODIUM PHOSPHATE, MONOBASIC 7; 19 G/133ML; G/133ML
1 ENEMA RECTAL ONCE AS NEEDED
Status: DISCONTINUED | OUTPATIENT
Start: 2020-01-01 | End: 2020-01-01

## 2020-01-01 RX ORDER — HEPARIN SODIUM 5000 [USP'U]/ML
5000 INJECTION, SOLUTION INTRAVENOUS; SUBCUTANEOUS EVERY 12 HOURS SCHEDULED
Status: DISCONTINUED | OUTPATIENT
Start: 2020-01-01 | End: 2020-01-01

## 2020-01-01 RX ORDER — POTASSIUM CHLORIDE 29.8 MG/ML
40 INJECTION INTRAVENOUS ONCE
Status: DISCONTINUED | OUTPATIENT
Start: 2020-01-01 | End: 2020-01-01

## 2020-01-01 RX ORDER — DONEPEZIL HYDROCHLORIDE 5 MG/1
5 TABLET, FILM COATED ORAL NIGHTLY
Status: DISCONTINUED | OUTPATIENT
Start: 2020-01-01 | End: 2020-01-01

## 2020-01-01 RX ORDER — ALLOPURINOL 300 MG/1
300 TABLET ORAL DAILY
Status: DISCONTINUED | OUTPATIENT
Start: 2020-01-01 | End: 2020-01-01

## 2020-01-01 RX ORDER — ACETAMINOPHEN 160 MG/5ML
650 SOLUTION ORAL EVERY 6 HOURS PRN
Status: DISCONTINUED | OUTPATIENT
Start: 2020-01-01 | End: 2020-01-01

## 2020-01-01 RX ORDER — HYDROMORPHONE HYDROCHLORIDE 1 MG/ML
0.5 INJECTION, SOLUTION INTRAMUSCULAR; INTRAVENOUS; SUBCUTANEOUS EVERY 30 MIN PRN
Status: DISCONTINUED | OUTPATIENT
Start: 2020-01-01 | End: 2020-01-01

## 2020-01-01 RX ORDER — POTASSIUM CHLORIDE 1.5 G/1.77G
40 POWDER, FOR SOLUTION ORAL EVERY 4 HOURS
Status: DISPENSED | OUTPATIENT
Start: 2020-01-01 | End: 2020-01-01

## 2020-01-04 ENCOUNTER — HOSPITAL (OUTPATIENT)
Dept: OTHER | Age: 85
End: 2020-01-04

## 2020-01-04 LAB
AMORPH SED URNS QL MICRO: ABNORMAL
ANALYZER ANC (IANC): ABNORMAL
ANION GAP SERPL CALC-SCNC: 6 MMOL/L (ref 10–20)
APPEARANCE UR: ABNORMAL
BACTERIA #/AREA URNS HPF: ABNORMAL /HPF
BASOPHILS # BLD: 0 K/MCL (ref 0–0.3)
BASOPHILS NFR BLD: 1 %
BILIRUB UR QL: NEGATIVE
BUN SERPL-MCNC: 23 MG/DL (ref 6–20)
BUN/CREAT SERPL: 21 (ref 7–25)
CALCIUM SERPL-MCNC: 8.9 MG/DL (ref 8.4–10.2)
CAOX CRY URNS QL MICRO: ABNORMAL
CHLORIDE SERPL-SCNC: 105 MMOL/L (ref 98–107)
CO2 SERPL-SCNC: 34 MMOL/L (ref 21–32)
COLOR UR: YELLOW
CREAT SERPL-MCNC: 1.1 MG/DL (ref 0.67–1.17)
DIFFERENTIAL METHOD BLD: ABNORMAL
EOSINOPHIL # BLD: 0.1 K/MCL (ref 0.1–0.5)
EOSINOPHIL NFR BLD: 1 %
EPITH CASTS #/AREA URNS LPF: ABNORMAL /[LPF]
ERYTHROCYTE [DISTWIDTH] IN BLOOD: 13.6 % (ref 11–15)
FATTY CASTS #/AREA URNS LPF: ABNORMAL /[LPF]
GLUCOSE SERPL-MCNC: 81 MG/DL (ref 65–99)
GLUCOSE UR-MCNC: NEGATIVE MG/DL
GRAN CASTS #/AREA URNS LPF: ABNORMAL /[LPF]
HCT VFR BLD CALC: 38.5 % (ref 39–51)
HGB BLD-MCNC: 11.7 G/DL (ref 13–17)
HGB UR QL: ABNORMAL
HYALINE CASTS #/AREA URNS LPF: ABNORMAL /LPF (ref 0–5)
IMM GRANULOCYTES # BLD AUTO: 0 K/MCL (ref 0–0.2)
IMM GRANULOCYTES NFR BLD: 0 %
KETONES UR-MCNC: NEGATIVE MG/DL
LEUKOCYTE ESTERASE UR QL STRIP: NEGATIVE
LYMPHOCYTES # BLD: 1.6 K/MCL (ref 1–4)
LYMPHOCYTES NFR BLD: 25 %
MCH RBC QN AUTO: 28.9 PG (ref 26–34)
MCHC RBC AUTO-ENTMCNC: 30.4 G/DL (ref 32–36.5)
MCV RBC AUTO: 95.1 FL (ref 78–100)
MIXED CELL CASTS #/AREA URNS LPF: ABNORMAL /[LPF]
MONOCYTES # BLD: 0.7 K/MCL (ref 0.3–0.9)
MONOCYTES NFR BLD: 11 %
MUCOUS THREADS URNS QL MICRO: ABNORMAL
NEUTROPHILS # BLD: 3.8 K/MCL (ref 1.8–7.7)
NEUTROPHILS NFR BLD: 62 %
NEUTS SEG NFR BLD: ABNORMAL %
NITRITE UR QL: NEGATIVE
NRBC (NRBCRE): 0 /100 WBC
PH UR: 5 UNITS (ref 5–7)
PLATELET # BLD: 259 K/MCL (ref 140–450)
POTASSIUM SERPL-SCNC: 4.5 MMOL/L (ref 3.4–5.1)
PROT UR QL: NEGATIVE MG/DL
RBC # BLD: 4.05 MIL/MCL (ref 4.5–5.9)
RBC #/AREA URNS HPF: >100 /HPF (ref 0–2)
RBC CASTS #/AREA URNS LPF: ABNORMAL /[LPF]
RENAL EPI CELLS #/AREA URNS HPF: ABNORMAL /[HPF]
SODIUM SERPL-SCNC: 140 MMOL/L (ref 135–145)
SP GR UR: 1.02 (ref 1–1.03)
SPECIMEN SOURCE: ABNORMAL
SPERM URNS QL MICRO: ABNORMAL
SQUAMOUS #/AREA URNS HPF: ABNORMAL /HPF (ref 0–5)
T VAGINALIS URNS QL MICRO: ABNORMAL
TRI-PHOS CRY URNS QL MICRO: ABNORMAL
URATE CRY URNS QL MICRO: ABNORMAL
URINE REFLEX: ABNORMAL
URNS CMNT MICRO: ABNORMAL
UROBILINOGEN UR QL: 2 MG/DL (ref 0–1)
WAXY CASTS #/AREA URNS LPF: ABNORMAL /[LPF]
WBC # BLD: 6.2 K/MCL (ref 4.2–11)
WBC #/AREA URNS HPF: ABNORMAL /HPF (ref 0–5)
WBC CASTS #/AREA URNS LPF: ABNORMAL /[LPF]
YEAST HYPHAE URNS QL MICRO: ABNORMAL
YEAST URNS QL MICRO: ABNORMAL

## 2020-04-08 PROBLEM — R41.82 ALTERED MENTAL STATUS: Status: ACTIVE | Noted: 2020-01-01

## 2020-04-08 PROBLEM — R41.82 ALTERED MENTAL STATUS, UNSPECIFIED ALTERED MENTAL STATUS TYPE: Status: ACTIVE | Noted: 2020-01-01

## 2020-04-08 NOTE — PROCEDURES
Date of Procedure: 4/8/2020    Procedure: EEG (ELECTROENCEPHALOGRAM)     DX:AMS  HX:A 88HV MALE WHO PRESENTS WITH CONFUSION. PT APPARENTLY WAS WITH FAMILY AND PT SLUMPED OVER AND WAS DIFFICULT TO RUSE FOR SEVERAL MINS.  PT WAS GROGGY FOR A WHILE BUT ROUSABL

## 2020-04-08 NOTE — PROGRESS NOTES
NURSING ADMISSION NOTE      Patient admitted via Cart  Oriented to room. Safety precautions initiated. Bed in low position. Call light in reach.     Pt alert to self hx of dementia  Room Air  Navigator completed with help from pt sister  BLUE on Tele

## 2020-04-08 NOTE — ED INITIAL ASSESSMENT (HPI)
Patient with hx of dementia. Per ems report family stated more altered than normal. Patient normally pretty verbal however not speaking at this time. BS in field 162.

## 2020-04-08 NOTE — H&P
[facility] H[facility]PITALIST  History and Physical     Monique Baum Patient Status:  Emergency    10/7/1935 MRN [id number]   Location [facility] [facility] Attending WillYaneli MD   Hosp Day # 0 PCP Jw Guadalupe MD     Chief Complaint:confus every 6 (six) hours as needed for Pain., Disp: , Rfl:   Donepezil HCl 5 MG Oral Tab, Take 5 mg by mouth nightly., Disp: , Rfl:   Acetaminophen ER (TYLENOL 8 HOUR ARTHRITIS PAIN) 650 MG Oral Tab CR, Take 650 mg by mouth every 8 (eight) hours as needed for P Recent Labs   Lab 04/07/20  2335   *   BUN 33*   CREATSERUM 1.99*   GFRAA 35*   GFRNAA 30*   CA 8.9   ALB 3.0*      K 3.7      CO2 30.0   ALKPHO 154*   AST 18   ALT 25   BILT 0.4   TP 7.8       Estimated Creatinine Clearance: 32.4

## 2020-04-08 NOTE — ED PROVIDER NOTES
Patient Seen in: [facility] Emergency Department      History   Patient presents with:  Altered Mental Status    Stated Complaint: AMS     HPI    The patient is an 70-year-old male with a history of dementia, who resides at his sister's home, jonnie Physical Exam    General: Elderly male laying back in recumbent position, sleepy, eyes closed, but opens eyes to noxious stimuli, able to answer certain simple questions, but slow to answer questions, not conversant spontaneously.   Follows simple c 0.6 (*)     All other components within normal limits   CBC W/ DIFFERENTIAL - Abnormal; Notable for the following components:    HGB 11.6 (*)     HCT 37.1 (*)     RDW-SD 49.2 (*)     All other components within normal limits   TROPONIN I - Normal   CBC WIT without contrast  IMPRESSION:  No acute intracranial abnormality. No acute territorial infarct, hemorrhage, mass effect, or midline shift. Mild microangiopathy and moderate volume loss.         On straight cath, bladder was empty, he does have an elevated

## 2020-04-08 NOTE — PLAN OF CARE
Problem: Patient/Family Goals  Goal: Patient/Family Long Term Goal  Description  Patient's Long Term Goal: Pt able to ambulate independently    Interventions:  - Encourage ambulation  - See additional Care Plan goals for specific interventions   4/8/2020

## 2020-04-08 NOTE — PROGRESS NOTES
[facility]PITALIST  Progress Note     Vicki Lopez Patient Status:  Observation    10/7/1935 MRN [id number]   [facility] 3NE-A Attending Dali Snowden MD   Hosp Day # 0 PCP Eugena Essex, MD     Chief Complaint: confusion    S: Patient wi Imaging data reviewed in Epic.     Medications:   • [START ON 4/9/2020] Heparin Sodium (Porcine)  5,000 Units Subcutaneous 2 times per day   • ceFAZolin  1 g Intravenous Q12H   • allopurinol  300 mg Oral Daily   • aspirin  81 mg Oral Daily   • atorvastatin

## 2020-04-08 NOTE — CM/SW NOTE
10:57am    MSW spoke to pt's sister Spring Noel [phone]; she states she helps take her of her brother full time. Pt has a niece as well (not Tift's dtr) who lives in home and helps care for him. Pt's dtr lives in [place].  Pt is current with MD

## 2020-04-08 NOTE — PROGRESS NOTES
Pharmacy Note: Renal dose adjustment of Ancef    Vivi Uriostegui is a 80year old male who has been prescribed Ancef 1gm every 8 hours. CrCl is 32.4 ml/min so the dose has been adjusted  to 1gm IV every 12 hours per hospital renal dose adjustment protocol.

## 2020-04-08 NOTE — PLAN OF CARE
Problem: Patient/Family Goals  Goal: Patient/Family Long Term Goal  Description  Patient's Long Term Goal: Pt able to ambulate independently    Interventions:  - Encourage ambulation  - See additional Care Plan goals for specific interventions   Outc

## 2020-04-08 NOTE — BH PROGRESS NOTE
Bed in low position  Call light in reach. Pt alert to self with hx of dementia  Speech difficult to understand  Pt confused.    Pt reoriented frequently  Up in chair most of the afternoon  Pt up with assist x2 with walker  Room Air  NSR on Tele  Regular di

## 2020-04-08 NOTE — PHYSICAL THERAPY NOTE
PHYSICAL THERAPY EVALUATION - INPATIENT     Room Number: 5360/6540-D  Evaluation Date: 4/8/2020  Type of Evaluation: Initial  Physician Order: PT Eval and Treat    Presenting Problem: AMS  Reason for Therapy: Mobility Dysfunction and Discharge Planni alright\"    Patient self-stated goal is - unable to state meaningful goal.    OBJECTIVE  Precautions: Bed/chair alarm  Fall Risk: High fall risk    WEIGHT BEARING RESTRICTION  Weight Bearing Restriction: None                PAIN ASSESSMENT  Ratin  Loc Score: 13   Approx Degree of Impairment: 64.91%   Standardized Score (AM-PAC Scale): 36.74   CMS Modifier (G-Code): CL    FUNCTIONAL ABILITY STATUS  Gait Assessment   Gait Assistance:  Moderate assistance  Distance (ft): 12  Assistive Device: Rolling walker Treatment Plan: Bed mobility; Family education;Gait training;Strengthening;Transfer training;Balance training  Rehab Potential : Good  Frequency (Obs): 5x/week  Number of Visits to Meet Established Goals: 5      CURRENT GOALS    Goal #1 Patient is able to d

## 2020-04-09 NOTE — CM/SW NOTE
MSW spoke to RN, pt ready for dc. VM left for MSW from Pt's sister agreeing she wants a ride set up to take patient home. Due to dementia BLS arranged. Rn states pt is A0x1.   Sister called by MSW and agreeable to 4pm dc      DC PLAN:  GEMA arranged  # for E

## 2020-04-09 NOTE — DISCHARGE SUMMARY
[facility] HOSPITALIST  DISCHARGE SUMMARY     Ramesh Rosas Patient Status:  Observation    10/7/1935 MRN [id number]   [facility] 3NE-A Attending Sol Michaels MD   Hosp Day # 0 PCP Reid Oh MD     Date of Admission: 2020  Date of Sienna Wagner Olmesartan Medoxomil-HCTZ      Take one tablet by mouth every day   Quantity:  30 tablet  Refills:  3     Citalopram Hydrobromide 20 MG Tabs  Commonly known as:  CeleXA      Take 20 mg by mouth.    Refills:  0     Donepezil HCl 5 MG Tabs  Commonly known as: chart    Soledad Her MD    Time spent:  > 30 minutes

## 2020-04-09 NOTE — OCCUPATIONAL THERAPY NOTE
OCCUPATIONAL THERAPY EVALUATION - INPATIENT     Room Number: 1260/6003-C  Evaluation Date: 4/9/2020  Type of Evaluation: Initial  Presenting Problem: AMS    Physician Order: IP Consult to Occupational Therapy  Reason for Therapy: ADL/IADL Dysfunction and D requires assist to stand from bed <> chair with increased assist later in the day. Pt experiences sundowning and has difficulty going to sleep at regular time.  Pt sister will try to walk pt with RW short distances in home, but mentioned that pt primarily u OF DAILY LIVING ASSESSMENT  AM-PAC ‘6-Clicks’ Inpatient Daily Activity Short Form  How much help from another person does the patient currently need…  -   Putting on and taking off regular lower body clothing?: A Lot  -   Bathing (including washing, rinsin patient’s ability to participate in BADLs and functional transfers, instrumental activities of daily living, rest and sleep, work, leisure and social participation.      The patient is functioning below his previous functional level and would benefit from s program).

## 2020-04-09 NOTE — PLAN OF CARE
Patient is A&O x1, pleasantly confused. VSS- NSR on tele. SpO2 remains stable on RA. Diminished lung sounds. Patient difficult to understand at times and has mumbled/delayed responses. Patient slightly impulsive- fall precautions in place.  Patient able to interruptions  - Encourage family to assist in orientation and promotion of home routines  Outcome: Progressing

## 2020-04-09 NOTE — PHYSICAL THERAPY NOTE
PHYSICAL THERAPY TREATMENT NOTE - INPATIENT    Room Number: 5893/3059-W     Session: 1   Number of Visits to Meet Established Goals: 5    Presenting Problem: AMS    Problem List  Principal Problem:    Altered mental status, unspecified altered mental stat Total       AM-PAC Score:  Raw Score: 9   Approx Degree of Impairment: 81.38%   Standardized Score (AM-PAC Scale): 30.55   CMS Modifier (G-Code): CM    FUNCTIONAL ABILITY STATUS  Gait Assessment   Gait Assistance: Not tested  Distance (ft): 0  Assistive Kaleb Europe assistance      Goal #2 Patient is able to demonstrate transfers EOB to/from [facility] at assistance level: minimum assistance      Goal #3 Patient is able to ambulate 100 feet with assist device: rolling walker or hand held assist at assistance level: minimum a

## 2020-10-12 PROBLEM — J96.00 ACUTE RESPIRATORY FAILURE (HCC): Status: ACTIVE | Noted: 2020-01-01

## 2020-10-12 PROBLEM — A41.9 SEPSIS WITH HYPOTENSION (HCC): Status: ACTIVE | Noted: 2020-01-01

## 2020-10-12 PROBLEM — N17.9 ACUTE KIDNEY INJURY (HCC): Status: ACTIVE | Noted: 2020-01-01

## 2020-10-12 PROBLEM — R00.1 SYMPTOMATIC BRADYCARDIA: Status: ACTIVE | Noted: 2020-01-01

## 2020-10-12 PROBLEM — I95.9 SEPSIS WITH HYPOTENSION (HCC): Status: ACTIVE | Noted: 2020-01-01

## 2020-10-12 PROBLEM — J96.00 ACUTE RESPIRATORY FAILURE, UNSPECIFIED WHETHER WITH HYPOXIA OR HYPERCAPNIA (HCC): Status: ACTIVE | Noted: 2020-01-01

## 2020-10-12 PROBLEM — A41.9 SEPSIS DUE TO PNEUMONIA (HCC): Status: ACTIVE | Noted: 2020-01-01

## 2020-10-12 PROBLEM — R73.9 HYPERGLYCEMIA: Status: ACTIVE | Noted: 2020-01-01

## 2020-10-12 PROBLEM — A41.9 SEPTIC SHOCK (HCC): Status: ACTIVE | Noted: 2020-01-01

## 2020-10-12 PROBLEM — E87.6 HYPOKALEMIA: Status: ACTIVE | Noted: 2020-01-01

## 2020-10-12 PROBLEM — J18.9 SEPSIS DUE TO PNEUMONIA (HCC): Status: ACTIVE | Noted: 2020-01-01

## 2020-10-12 PROBLEM — D64.9 ANEMIA: Status: ACTIVE | Noted: 2020-01-01

## 2020-10-12 PROBLEM — R65.21 SEPTIC SHOCK (HCC): Status: ACTIVE | Noted: 2020-01-01

## 2020-10-12 NOTE — SEPSIS REASSESSMENT
[facility]    Sepsis Reassessment Note    /59   Pulse 76   Temp 97 °F (36.1 °C) (Temporal)   Resp 18   SpO2 100%      4:38 PM    Cardiac:  Regularity: Regular  Rate: Normal  Heart Sounds: S1,S2    Lungs:   Right: Rhonchi  Left: Rhonchi    Altagracia

## 2020-10-12 NOTE — ED NOTES
Gisele: pt was brought in by medics, pt was on cpap and seem to have been improving. Pt had a total of 3 IV's started and he received 2 liters of 0.9NS, antibiotics, atropine, etomidate, succ, levophed.  Pt bp deteriorated and pt was intubated to secure ai

## 2020-10-12 NOTE — ED NOTES
Pt eyes open, looking at his sister. Pt squeezes hand on command. Pt ready for transfer to ICU, continues to have episodes of shaking and ? Posturing. Does not appear as if patient is seizing.

## 2020-10-12 NOTE — ED PROVIDER NOTES
Patient Seen in: [facility] Emergency Department      History   Patient presents with:  Difficulty Breathing    Stated Complaint: ANGLE    HPI    This is an 77-year-old male brought in by EMS personnel due to report of altered mental status with respi (Temporal)   Resp 18   SpO2 100%         Physical Exam  General: Patient is responsive to painful stimuli. Moaning at times. On BiPAP at this point. Will withdraw extremities away from painful stimuli.     HEENT: His pupils react to light though his scle (*)     .0 (*)     RDW 16.6 (*)     RDW-SD 59.3 (*)     Lymphocyte Absolute 0.53 (*)     All other components within normal limits   RAPID SARS-COV-2 BY PCR - Normal   CBC WITH DIFFERENTIAL WITH PLATELET    Narrative:      The following orders were c continued on noninvasive positive ventilation. All personnel were in full PPE including P100 masks. Laboratories were sent. The patient received IV fluids at 30 cc/kg on the presumption of sepsis as well as received Zosyn intravenously.   The patient's lac 63 minutes of critical care time (exclusive of billable procedures) was administered to manage the patient's critical lab values, critical imaging findings, unstable vital signs, respiratory instability and cardiovascular instability due to his sepsis due

## 2020-10-12 NOTE — ED INITIAL ASSESSMENT (HPI)
MEDICS CALLED FOR AN ILL PATIENT. PT IS NOT RESPONSIVE, PT IS ON CPAP, VITALS IMPROVED.  UNRESPONSIVE

## 2020-10-12 NOTE — CONSULTS
[facility] PULMONARY/CRITICAL CARE CONSULTATION       HPI: Sia Quesada is a 80year old male with a history of dementia, HTN, hyperlipidemia , diabete who presented to the ER today with respiratory difficulty and altered mental status.  Patient has apparently be aspirin 81 MG Oral Chew Tab   Yes No   Sig: Chew 81 mg by mouth daily. atorvastatin 20 MG Oral Tab   Yes No   Sig: Take 20 mg by mouth nightly.   gabapentin 300 MG Oral Cap   Yes No   Sig: Take 300 mg by mouth nightly.    melatonin 5 MG Oral Cap   Yes N Active member of club or organization: Not on file        Attends meetings of clubs or organizations: Not on file        Relationship status: Not on file      Intimate partner violence        Fear of current or ex partner: Not on file        Emotiona -repeat ABG now, place arterial line [if possible] for BP monitoring  -antibiotics as below  2. Septic shock - secondary to pneumonia. Rapid COVID neg. Rapid flu / RSV neg. Lacate elevated on presentation.    -IVF  -IV antibiotics: Zosyn, azithro  -pressors

## 2020-10-12 NOTE — ED NOTES
Pt has been clamped on the E.T and right lower front tooth noticed to be lose, RN was able to relax patient jaw and the tooth fell out. Tooth was placed in a bag and put at the bedside.

## 2020-10-12 NOTE — H&P
[facility] H[facility]PITALIST  History and Physical     Abdoul Palm Patient Status:  Emergency    10/7/1935 MRN [id number]   Location [facility] [facility] Attending Lisa Damon MD   Hosp Day # 0 PCP PHYSICIAN NONSTAFF     Chief Complaint: Estel Speck Rfl:     •  traZODone HCl 50 MG Oral Tab, Take 50 mg at night as needed for insomnia, Disp: , Rfl:     •  atorvastatin 20 MG Oral Tab, Take 20 mg by mouth nightly., Disp: , Rfl:     •  Misc Natural Products (OSTEO BI-FLEX JOINT SHIELD OR), Take 1 tablet by or organomegaly. Neurologic: No focal neurological deficits. CNII-XII grossly intact. Musculoskeletal: Moves all extremities. Extremities: No edema or cyanosis. Integument: No rashes or lesions. Psychiatric: Appropriate mood and affect.       Diagnost

## 2020-10-13 NOTE — PLAN OF CARE
Received sedated with propofol and Fentanyl drips; not following commands, grimaced to pain like when suctioned or turned to sides;  +tremors, spastic extremities; Propofol titrated up  Intubated with ETT @ FIO2 100, PEEP 5+, lungs diminished  SR-SB on tel oxygenation  Description: INTERVENTIONS:  - Assess for changes in respiratory status  - Assess for changes in mentation and behavior  - Position to facilitate oxygenation and minimize respiratory effort  - Oxygen supplementation based on oxygen saturation neurological status  - Initiate measures to prevent increased intracranial pressure  - Maintain blood pressure and fluid volume within ordered parameters to optimize cerebral perfusion and minimize risk of hemorrhage  - Monitor temperature, glucose, and so

## 2020-10-13 NOTE — PROGRESS NOTES
[facility] HOSPITALIST  Progress Note     Starlette Culpeper Patient Status:  Inpatient    10/7/1935 MRN [id number]   [facility] 4SW-A Attending Miriam Olivares, DO   Hosp Day # 1 PCP PHYSICIAN NONSTAFF     Chief Complaint: Septic Shock    S: Patient Imaging: Imaging data reviewed in Epic.     Medications:   • sodium chloride 0.9%  500 mL Intravenous Once   • hydrocortisone Na succinate PF  100 mg Intravenous Q8H [address]   • piperacillin-tazobactam  3.375 g Intravenous Q8H   • azithromycin  500 mg Intra Fernando Daily, DO          **Certification      PHYSICIAN Certification of Need for Inpatient Hospitalization - Initial Certification    Patient will require inpatient services that will reasonably be expected to span two midnight's based on the clinical documen

## 2020-10-13 NOTE — PROCEDURES
Arterial Line  Performed by: Zeb Swanson  Authorized by: [id number] and Staff     Procedure Start: 7293  Patient Location:  ICU  Indication: continuous blood pressure monitoring and blood sampling needed    Site Identification: real

## 2020-10-13 NOTE — DIETARY NOTE
[facility]    NUTRITION INITIAL ASSESSMENT    NUTRITION DIAGNOSIS/PROBLEM:    Inadequate energy intake related to decreased ability to consume sufficient energy intake as evidenced by current NPO/vent status and reported poor appetite VIRGINIA Cárdenas oz)    NUTRITION:  Diet: NPO  Oral Supplements: N/A    FOOD/NUTRITION RELATED HISTORY:  Appetite: poor appetite PTA  Intake: 0%-NPO  Intake Meeting Needs: No  Food Allergies: No  Cultural/Ethnic/[employer]ist Preferences Addresses: Yes    NUTRITION RELATED PHY

## 2020-10-13 NOTE — PROGRESS NOTES
[facility] Pharmacy Note:  Renal Dose Adjustment for Metoclopramide (REGLAN)    Batshevashira Penn Sapauloyessenia has been prescribed Metoclopramide (REGLAN) 10 mg IV every 8 hours as needed for nausea/vomiting.     Estimated Creatinine Clearance: 33.1 mL/min (A) (based on SCr of 1.4

## 2020-10-13 NOTE — RESPIRATORY THERAPY NOTE
Received pt vented, VC+ 18/600/100%/+5, tolerating well. ABG drawn, FiO2 decreased to 40%. Will continue to monitor closely.

## 2020-10-13 NOTE — PROCEDURES
[address] [facility] Patient Status:  Inpatient    10/7/1935 MRN [id number]   [facility] 4SW-A Attending Marques Hogue, DO   Hosp Day # 1 PCP PHYSICIAN NONSTAFF     Central Line Placement  Indications: Vascular access    Anesthe

## 2020-10-13 NOTE — PROGRESS NOTES
Patient is on Vent settings noted below. Breath sounds are bilaterally diminished. Suctioning scant amount of thick white secretions. No changes made to Vent settings. Will continue to monitor.      10/13/20 3205   Vent Information   $ RT [employer] (pe

## 2020-10-13 NOTE — PROGRESS NOTES
[facility] Pharmacy Note:  Initiation of Stress Ulcer Prophylaxis     Vivi Calvillo is a 80year old patient and meets criteria for the initiation of stress ulcer prophylaxis based on the presence of: Mechanical Ventilation.     famotidine (PEPCID) has been initia

## 2020-10-13 NOTE — CM/SW NOTE
sw reviewed chart for dc planning. Pt resides at home with his sister Hodan Esquivel and niece that support his care. Pt has history of Provider [facility] [address], RAKESH confirmed not currently active.  Pt has monthly visits at home from [facility] at home [phone]

## 2020-10-13 NOTE — PLAN OF CARE
RECEIVED PT INTUBATED AND SEDATED ON LEVO AND VASO, PT BECAME INCREASINGLY HYPOTENSIVE, OPAL GTT WAS STARTED, DR. Surendra Pedersen AT BEDSIDE PLACED TRIPLE LUMEN R IJ CVC, APN JAIR PLACED L RADIAL ART LINE,  TOLERATING VENT SETTINGS, X1 BM, OG TO LIS, WEANING VASOPRE Assess the need for suctioning and perform as needed  - Assess and instruct to report SOB or any respiratory difficulty  - Respiratory Therapy support as indicated  - Manage/alleviate anxiety  - Monitor for signs/symptoms of CO2 retention  Outcome: Not Pro

## 2020-10-13 NOTE — CONSULTS
Karol Curran 1397  [id number]  Hospital Day #1  Date of Consult:   10/13/2020        Reason for Consultation:      Consult requested by Keith Duran for evaluation of palliative care needs, goals of car phenylephrine in NaCl (OPAL-SYNEPHRINE) 50 mg/250 ml premix infusion SOLN, 100-200 mcg/min, Intravenous, Continuous  •  hydrocortisone Na succinate PF (SOLU-cortef) injection 100 mg, 100 mg, Intravenous, Q8H MARCO ANTONIO  •  famoTIDine (PEPCID) tab 20 mg, 20 mg, Ora Enema (FLEET) 7-19 GM/118ML enema 133 mL, 1 enema, Rectal, Once PRN  •  Chlorhexidine Gluconate (PERIDEX) 0.12 % solution 15 mL, 15 mL, Mouth/Throat, [email or web address]  •  propofol (DIPRIVAN) infusion, 5-100 mcg/kg/min (Dosing Weight), Intravenous, Continuous [place]  [facility]                                                          [place], [address] [place] [address] Abdoul                                                                [phone]    --------------------------------------- Transvalvular velocity was within the normal     range. There was no stenosis. Trivial regurgitation. 3. Aorta: Ascending aorta diameter was 4cm. 4. Aortic root: The aortic root was normal.  5. Mitral valve: Mildly calcified annulus.  Transvalvular veloci Structurally normal valve. Leaflet separation was  normal.  Doppler:  Transvalvular velocity was within the normal range. There  was no evidence for stenosis. There was mild regurgitation. Pulmonic valve:   Not well visualized.   Structurally normal valv aorta ID, A-P, ED                     3.9   cm     ---------      Left atrium                                     Value        Reference   LA ID, A-P, ES                                  3.7   cm     3.0 - 4.0   LA ID/bsa, A-P by  Hazel Kilgore MD  10/13/2020 13:00  XR CHEST AP PORTABLE  (CPT=71045)  Narrative: PROCEDURE:  XR CHEST AP PORTABLE  (CPT=71045)     TECHNIQUE:  AP chest radiograph was obtained.      COMPARISON:  [facility] , XR, XR CHEST AP PORTABLE  (CPT=71045 sit/lie Extensive Disease  Can’t do any work   Partial Assist Normal or Reduced Full or confused   40 Mainly in bed Extensive Disease  Can’t do any work Mainly Assist Normal or Reduced Full or confused   30 Bedbound Extensive Disease  Can’t do any work Max Will need to further discuss goals of care with Janna, the sister when she is available. According to the daughter Hiram Velez, she wants full treatment for her father and then allow God to determine. 2. CODE STATUS:  Full Code   3.  POLST: deferred  - Healt

## 2020-10-13 NOTE — PROGRESS NOTES
[facility] PULMONARY/CRITICAL CARE    S: Pt remains intubated, sedated. Now on 3 pressors: levophed, vasopressin, neosynephrine.      Meds:  • piperacillin-tazobactam  3.375 g Intravenous Q8H   • azithromycin  500 mg Intravenous Q24H   • Heparin Sodium (Porcine) 10/13/20  0511   WBC 5.0 6.3   HGB 9.6* 10.0*   .0* 138.0*     Recent Labs   Lab 10/12/20  1358 10/12/20  1756 10/13/20  0511    141 139   K 4.1 3.9 4.3    110 107   CO2 24.0 26.0 24.0   BUN 37* 36* 39*   CREATSERUM 1.53* 1.48* 1.43*   G

## 2020-10-14 NOTE — PROGRESS NOTES
[facility] PULMONARY/CRITICAL CARE    S: Pt remains intubated, sedated. He remains on pressors: levophed, vasopressin, neosynephrine.      Meds:  • potassium chloride  40 mEq Per NG Tube Once   • magnesium oxide  400 mg Per NG Tube Once   • hydrocortisone Na succi hour   Intake 6587.31 ml   Output 1710 ml   Net 4877.31 ml       Gen - intubated, sedated, moves limbs spontaneously  Lungs -  CTAB  CV - regular rate & rhythm. Normal S1, S2. No murmurs, rubs, or gallops appreciated.   Abdomen - soft, nontender to palpatio - sc heparin  10. Dispo - full code at this time, I would recommend DNR; family to discuss this and let us know their decision. -ICU, pt is critically ill  -Palliative care consulted. 35 min CC time    Tommy Waggoner M.D.   Pulmonary/Critical Care

## 2020-10-14 NOTE — PROGRESS NOTES
[facility]  Progress Note     Jevon Richiekoki Patient Status:  Inpatient    10/7/1935 MRN [id number]   [facility] 4SW-A Attending Ivette Rush, DO   Hosp Day # 2 PCP PHYSICIAN NONSTAFF     Chief Complaint: Septic Shock    S: Patient Estimated Creatinine Clearance: 43.4 mL/min (based on SCr of 1.17 mg/dL). Recent Labs   Lab 10/12/20  1902   PTP 13.8   INR 1.03       No results for input(s): TROP, CK in the last 168 hours. Imaging: Imaging data reviewed in Epic.     Medi Javier Gardner, [address]

## 2020-10-14 NOTE — PROGRESS NOTES
Current vent settings noted below, BS have been clear.        10/14/20 0515   Vent Information   $ RT Standby Charge (per 15 min) 1  (vent check)   Ventilator Initiation 10/12/20   Ventilation Day(s) 3   Interface Invasive   Vent Type    Vent -1

## 2020-10-14 NOTE — PLAN OF CARE
Assumed care of patient at 1900. Patient remains intubated and sedated. Levo/vaso/mansoor required to maintain SBP >90 or MAP >65. Attempting to wean pressors slowly. Patient does not seem to be in pain. Patient tolerating turns and oral care.  COVID PCR (-) du

## 2020-10-14 NOTE — PROGRESS NOTES
[facility]  Follow Up     Mario Tate  [id number]  Hospital Day #2  Date of Consult: 10/13/20  Patient seen at: [facility]     Subjective:      Patient was seen and examined without family  at the bedside.      Vivi Continuous  •  fentaNYL citrate (SUBLIMAZE) 0.05 MG/ML injection 25 mcg, 25 mcg, Intravenous, Q30 Min PRN **OR** fentaNYL citrate (SUBLIMAZE) 0.05 MG/ML injection 50 mcg, 50 mcg, Intravenous, Q30 Min PRN  •  acetaminophen (TYLENOL) tab 650 mg, 650 mg, Oral 10/14/2020    K 3.8 10/14/2020     10/14/2020    CO2 24.0 10/14/2020     (H) 10/14/2020    CA 8.0 (L) 10/14/2020    ALB 2.0 (L) 10/14/2020    ALKPHO 93 10/14/2020    BILT 0.5 10/14/2020    TP 5.8 (L) 10/14/2020    AST 28 10/14/2020    ALT 30 1 General: Appears chronically ill  Body habitus: Thin   HEENT: Mucus membranes dry  Cardiac: Regular rate   Lungs: Clear anterior,  Normal excursions and effort on vent.   Abdomen: Soft, non-tender, non-distended  Extremities: Without clubbing, cyanosis as well will cause trauma to him. She feels although she is the POA she needs to allow his children to have input on his care. His 4 children feel he needs aggressive management and treatment.  I encouraged Janna to have his two children/ care partners Discussed today’s visit with Pavel Kohler. A total of 35 minutes were spent on this follow up; direct face to face contact,  physical examination and >50% was spent counseling and coordinating care. We will continue to follow.     [address]

## 2020-10-14 NOTE — PLAN OF CARE
Assumed care of patient. Pt is intubated and sedated. Pt responds to painful stimuli but does not appear to have purposeful movement. Hector titrated off , pt maintain SBP >90 or MAP >65. SAT/SBT held due to hemodynamic instability per protocol.  Antibiotics c

## 2020-10-15 NOTE — PROGRESS NOTES
[facility] PULMONARY/CRITICAL CARE    S: Pt remains intubated, sedated. He remains on levophed, vasopressin.      Meds:  • potassium chloride  40 mEq Oral Q4H   • hydrocortisone Na succinate PF  100 mg Intravenous Q8H [address]   • famoTIDine  20 mg Oral Daily    Or   • rubs, or gallops appreciated. Abdomen - soft, nontender to palpation   Extremities - No cyanosis, clubbing, edema appreciated.       Labs:  Recent Labs   Lab 10/13/20  0511 10/14/20  0409 10/15/20  0429   WBC 6.3 15.2* 15.4*   HGB 10.0* 9.0* 7.3*    tube feeds  9. Proph - stop sc heparin in light of dropping hgb. SCD's.   -H2RB for GI proph  10. Dispo - full code    -ICU, pt is critically ill  -Palliative care is following      35 min CC time    Megan Razo M.D.   Pulmonary/Critical Care

## 2020-10-15 NOTE — PROGRESS NOTES
10/15/20 0339   Vent Information   $ RT Standby Charge (per 15 min) 1   Ventilator Initiation 10/12/20   Ventilation Day(s) 4   Interface Invasive   Vent Type    Vent -1   Vent Mode VC+   Settings   FiO2 (%) 30 %   Resp Rate (Set) 18   Vt (Se

## 2020-10-15 NOTE — RESPIRATORY THERAPY NOTE
Received patient on the following settings. No changes made at this time. Will continue to monitor.                                                               10/14/20 4570   Vent Information   $ RT Standby Charge (per 15 min) 1   Ventilator Initiation 1

## 2020-10-15 NOTE — PROGRESS NOTES
[address] [facility] [address] Follow Up     Tyler Small  [id number]  Hospital Day #3  Date of Consult: 10/13/20  Patient seen at: [facility]     Subjective:      Patient was seen without family  at the bedside.      Vivi shin Medications:   •  potassium chloride (KLOR-CON) powder packet 40 mEq, 40 mEq, Oral, Q4H  •  famoTIDine (PEPCID) tab 20 mg, 20 mg, Oral, BID **OR** famoTIDine (PEPCID) injection 20 mg, 20 mg, Intravenous, BID  •  phenylephrine in NaCl (OPAL-SYNEPHRINE) 50 mg PRN  •  Fleet Enema (FLEET) 7-19 GM/118ML enema 133 mL, 1 enema, Rectal, Once PRN  •  Chlorhexidine Gluconate (PERIDEX) 0.12 % solution 15 mL, 15 mL, Mouth/Throat, [email or web address]  •  propofol (DIPRIVAN) infusion, 5-100 mcg/kg/min (Dosing Weight), Intravenou tip in the SVC. No pneumothorax. Mildly increased bilateral lower lobe consolidations.     Dictated by (CST): Jo Dickinson MD on 10/13/2020 at 9:27 AM     Finalized by (CST): Jo Dickinson MD on 10/13/2020 at 9:29 AM         Objective/Physical decisions. Araceli Yepez stated she does not want him to experience CPR if the situation were to occur requiring CPR. I explained that would change his code status to DNAR, she verbalized understanding.  Araceli Yepez also stated \"if he comes off the ventilator Benign essential hypertension    Thrombocytopenia (HCC)    Dementia due to Alzheimer's disease ([address]    Acute respiratory failure ([address]    Septic shock (HCC)    Hypokalemia    Anemia    Acute kidney injury ([address]    Hyperglycemia    Symptomatic bradycardia

## 2020-10-15 NOTE — DIETARY NOTE
[facility]    NUTRITION FOLLOW UP ASSESSMENT    NUTRITION DIAGNOSIS/PROBLEM:    Inadequate energy intake related to decreased ability to consume sufficient energy intake as evidenced by current NPO/vent status and reported poor appetite PTA.  (Resolvin oz)  04/07/20 : 83 kg (182 lb 15.7 oz)  12/16/19 : 82.7 kg (182 lb 6.4 oz)  11/06/19 : 85.7 kg (189 lb)  07/30/19 : 86.2 kg (190 lb)  10/12/18 : 90.3 kg (199 lb)  08/17/16 : 95.4 kg (210 lb 4 oz)    NUTRITION:  Diet: NPO + TF  Oral Supplements: N/A    FOOD

## 2020-10-15 NOTE — PLAN OF CARE
Received pt this AM. Sedated on Propofol and Fentanyl. Propofol weaned off and fentanyl decreased. Does not fc but does open eyes to voice/pain/spontaneously. Will also localize. Pupils equal and reactive. Tolerating current vent settings.  Breath trial att

## 2020-10-15 NOTE — PROGRESS NOTES
Pharmacy Note:  Renal Dose Adjustment         Vivi Villanueva has been prescribed famotidine 20mg intravenously and orally q24hr.     Est CrCl: ~60 mL/min (SHAMIKA - improving)    Est CrCl > 50 mL/min, therefore the dose of famotidine has been changed to 20mg i

## 2020-10-15 NOTE — PROGRESS NOTES
[facility]  Progress Note     Ellender Bound Patient Status:  Inpatient    10/7/1935 MRN [id number]   [facility] 4SW-A Attending Stanley Guillen DO   Hosp Day # 3 PCP PHYSICIAN NONSTAFF     Chief Complaint: Septic Shock    S: Patient --  93 87   AST 36  --   --  28 19   ALT 39  --   --  30 21   BILT 0.4  --   --  0.5 0.4   TP 6.0*  --   --  5.8* 5.0*    < > = values in this interval not displayed. Estimated Creatinine Clearance: 60.5 mL/min (based on SCr of 0.88 mg/dL).     Recen be discussed with RN and Dr. Mirna Sam.     Halina Benedict MD  [facility]

## 2020-10-15 NOTE — PLAN OF CARE
Assumed care of patient. Patient remains intubated and sedated. Patient able to localize pain more frequently and more easily awakened. Increased fentanyl early in shift due to patient biting on tube during cares.  Bite block placed to allow patient to rece

## 2020-10-16 NOTE — PROGRESS NOTES
10/16/20 0337   Vent Information   $ RT Standby Charge (per 15 min) 1   Ventilator Initiation 10/12/20   Ventilation Day(s) 5   Interface Invasive   Vent Type    Vent -1   Vent Mode VC+   Settings   FiO2 (%) 30 %   Resp Rate (Set) 18   Vt (Se

## 2020-10-16 NOTE — SLP NOTE
ADULT SWALLOWING EVALUATION    ASSESSMENT    ASSESSMENT/OVERALL IMPRESSION:  Order received for BSE to r/o aspiration. Pt extubated earlier today after 5 day intubation to vent. Pt dx'd with acute hypoxic respiratory failure due to PNA.  Per clinical d/w RN reassess       HISTORY   MEDICAL HISTORY  Reason for Referral: R/O aspiration    Problem List  Principal Problem:    Sepsis due to pneumonia [facility])  Active Problems:    Gout    Benign essential hypertension    Thrombocytopenia ([facility]    Dementia due to Alzhe aspiration cannot be evaluated clinically.  Videofluoroscopic Swallow Study is required to rule-out silent aspiration.)    Esophageal Phase of Swallow: No complaints consistent with possible esophageal involvement              FOLLOW UP  Treatment Plan/Gonzalez

## 2020-10-16 NOTE — PROGRESS NOTES
[address] [facility] [address] Follow Up     Aaron Levin  [id number]  Hospital Day #4  Date of Consult: 10/13/20  Patient seen at: [facility]     Subjective:      Patient was seen without family at the bedside.        Review of Systems MG rectal suppository 650 mg, 650 mg, Rectal, Q6H PRN  •  fentanyl (SUBLIMAZE) 1000 mcg/100 ml NS premix infusion,  mcg/hr, Intravenous, Continuous PRN  •  docusate sodium (COLACE) liquid 100 mg, 100 mg, Oral, BID  •  PEG 3350 (MIRALAX) powder packet 04/07/2020       COVID-19 Lab Results     COVID-19  Lab Results   Component Value Date    COVID19 Not Detected 10/12/2020    COVID19 Not Detected 10/12/2020           Inflammatory Markers  Recent Labs   Lab 10/13/20  0511 10/13/20  1053   CRP 14.60*  -- spoke to Janna this morning, she called stating family is bring the POA forms for his chart. Fidelia Zuñiga is aware Marleni has poor insight into her father's overall health.  Fidelia Zuñiga is not in favor of the decision but she feels it is important for his

## 2020-10-16 NOTE — DIETARY NOTE
[facility]    NUTRITION FOLLOW UP ASSESSMENT    NUTRITION DIAGNOSIS/PROBLEM:    Inadequate energy intake related to decreased ability to consume sufficient energy intake as evidenced by current NPO/vent status and reported poor appetite PTA.  Cassi months, which is severe wt loss per standards.      Patient Weight for the past 72 hrs:   Weight   10/12/20 1825 64.2 kg (141 lb 8.6 oz)   10/13/20 0400 66 kg (145 lb 8.1 oz)     Wt Readings from Last 10 Encounters:  10/16/20 : 72.5 kg (159 lb 13.3 oz)  04/ Benefits, risks, and possible complications of procedure explained to patient/caregiver who verbalized understanding and gave verbal consent.

## 2020-10-16 NOTE — PROGRESS NOTES
[facility]  Progress Note     Zeb Medina Patient Status:  Inpatient    10/7/1935 MRN [id number]   [facility] 4SW-A Attending Elana Silva, DO   Hosp Day # 4 PCP PHYSICIAN NONSTAFF     Chief Complaint: Septic Shock    S: Patient 28 19  --  25   ALT 30 21  --  23   BILT 0.5 0.4  --  0.5   TP 5.8* 5.0*  --  5.3*       Estimated Creatinine Clearance: 69.2 mL/min (based on SCr of 0.8 mg/dL).     Recent Labs   Lab 10/12/20  1902   PTP 13.8   INR 1.03       No results for input(s): TROP, and Dr. Iram Grover.     Fili Anton MD  [facility]

## 2020-10-16 NOTE — PROGRESS NOTES
Assessment and Objective  \"Progressing\"  INTERVENTIONS:  - Assess for changes in respiratory status  - Assess for changes in mentation and behavior  - Position to facilitate oxygenation and minimize respiratory effort  - Oxygen supplementation based on o FiO2 <= 0.5? (titrated for sats 92-94%) Y   Is the PEEP <= 5? Y   Is the RSBI <=104 Y   Is the patient off pressor and narcotic / sedation drips? Y   Is the patient free of ventricular arrhythmias in the past 24 hours? Y   Is the patient's cough adequate?

## 2020-10-16 NOTE — PROGRESS NOTES
[address] [facility] Patient Status:  Inpatient    10/7/1935 MRN [id number]   [facility] 4SW-A Attending Audrey Jackson MD   [address] Day # 4 PCP PHYSICIAN NONSTAFF     Critical Care Progress Note     Date of Admission: 10/12/2020 (SUBLIMAZE) 0.05 MG/ML injection 50 mcg, 50 mcg, Intravenous, Q30 Min PRN  •  acetaminophen (TYLENOL) tab 650 mg, 650 mg, Oral, Q6H PRN **OR** acetaminophen (TYLENOL) 160 MG/5ML oral liquid 650 mg, 650 mg, Oral, Q6H PRN **OR** acetaminophen (TYLENOL) 650 M [OOEAB:0936]      Physical Exam:                          General: awake, alert, cooperative, in NAD                          HEENT: ETT in place                          Lungs: Clear to auscultation bilaterally, no wheezes or crackles                      wean pressors as able  -IV antibiotics: Zosyn (started 10/12), completed azithro  -wean stress dose steroids, decreased to q 12 today   -f/u cultures, adjust abx as indicated  3.  Anemia - hgb dropping, no obvious bleeding  -holding heparin  -follow up stoo

## 2020-10-16 NOTE — PLAN OF CARE
Problem: Impaired Swallowing  Goal: Minimize aspiration risk  Description: Interventions:  Outcome: Not Progressing  Note: Recommend the following:  -NPO  -Gentle oral care/hygiene with use of suction, as tolerated

## 2020-10-16 NOTE — PLAN OF CARE
Received pt this AM. Does not follow commands but opens eyes to voice or spontaneously. Pupils equal and reactive. Breathing trial this AM better. Pt extubated to 2L NC and tolerated and weaned to RA. Gurgling and needing NT suctioning to clear secretions.

## 2020-10-16 NOTE — CM/SW NOTE
Care Progression Note:  Active Acute Medical Issue:   81 y/o female with hypoxemic and hypercapnic respiratory failure, intubated on 10/2, in setting of PNA and septic shock, sedation weaned off this morning, and now extubated today, currently on Lauretha Summer

## 2020-10-17 NOTE — VIDEO SWALLOW STUDY NOTE
ADULT VIDEOFLUOROSCOPIC SWALLOWING STUDY    Admission Date: 10/12/2020  Evaluation Date: 10/17/20  Radiologist: Dr. Lala Liu   Diet Recommendations - Solids: Puree  Diet Recommendations - Liquid: Nectar thick   *Proceed cautiously to fee Pneumonia     Imaging results: no recent imaging to report    Reason for Referral: R/O aspiration    Family/Patient Goals: Pt unable to state    ASSESSMENT   DYSPHAGIA ASSESSMENT  Test completed in conjunction with Radiologist.  Patient Positioned: Upright patient will tolerate puree consistency and nectar thick liquids without overt signs or symptoms of aspiration with 90 % accuracy over 2-3 session(s).   Not Addressed   Goal #2 The patient/family/caregiver will demonstrate understanding and implementation o

## 2020-10-17 NOTE — PLAN OF CARE
Patient on room air. Congested nonproductive cough requiring frequent nasotracheal suctioning. Confused, does not follow commands. Restless at times. Patient pulling at medical equipment, mitts applied. Remains off of pressors. NSR. NPO. Wright in place.  IV

## 2020-10-17 NOTE — PROGRESS NOTES
[address] [facility] Patient Status:  Inpatient    10/7/1935 MRN [id number]   [facility] 4SW-A Attending Catherine Recinos MD   [facility] Day # 5 PCP PHYSICIAN NONSTAFF     Critical Care Progress Note     Date of Admission: 10/12/2020 Q8H  •  acetaminophen (TYLENOL) tab 650 mg, 650 mg, Oral, Q6H PRN  •  ondansetron HCl (ZOFRAN) injection 4 mg, 4 mg, Intravenous, Q6H PRN  •  Metoclopramide HCl (REGLAN) injection 5 mg, 5 mg, Intravenous, Q8H PRN  •  vasopressin (PITRESSIN) 20 Units in sod kg)  04/07/20 : 182 lb 15.7 oz (83 kg)  12/16/19 : 182 lb 6.4 oz (82.7 kg)     I/O last 3 completed shifts:   In: 16628.6 [I.V.:73934.6; NG/GT:2328; IV PIGGYBACK:1400]  Out: 9788 [KKJXK:9060; Emesis/NG output:325]  I/O this shift:  In: 3090.2 [I.V.:2533.2; levophed. COVID neg. Rapid flu / RSV neg. Legionella, strep urine ag neg.  Sputum culture w/ NURF.  -IVF, wean pressors as able  -IV antibiotics: Zosyn (started 10/12), completed azithro  -wean stress dose steroids, will decrease to daily starting tomorrow

## 2020-10-17 NOTE — PLAN OF CARE
Problem: Impaired Swallowing  Goal: Minimize aspiration risk  Description: Interventions:  - Patient should be alert and upright for all feedings (90 degrees preferred)  - Offer food and liquids at a slow rate  - No straws  - Encourage small bites of gus

## 2020-10-17 NOTE — SLP NOTE
ADULT SWALLOWING RE-EVALUATION    ASSESSMENT    ASSESSMENT/OVERALL IMPRESSION:  Patient seen for re-evaluation of swallow at bedside due to increased alertness, overall responsiveness, and to further assist with nutrition POC.   Pt remains in ICU and RN req care, counseling/discussion    Palliative care encounter      Past Medical History  Past Medical History:   Diagnosis Date   • Back problem    • Dementia ([facility]    • Diabetes ([facility]    • Gout    • High blood pressure    • High cholesterol    • HTN (hypertens [address]

## 2020-10-17 NOTE — PROGRESS NOTES
[facility]  Progress Note     Vicki Lopez Patient Status:  Inpatient    10/7/1935 MRN [id number]   [facility] 4SW-A Attending Kristan Sullivan,    Hosp Day # 5 PCP PHYSICIAN NONSTAFF     Chief Complaint: Septic Shock    S: Patient 104   AST 19  --  25  --  22   ALT 21  --  23  --  24   BILT 0.4  --  0.5  --  0.4   TP 5.0*  --  5.3*  --  5.3*    < > = values in this interval not displayed. Estimated Creatinine Clearance: 78 mL/min (based on SCr of 0.71 mg/dL).     Recent Labs

## 2020-10-17 NOTE — PLAN OF CARE
0900:  More awake, answering simple questions at time  Mitts on to protect CVC  Art line discontinued  Frequent oral care  Turn every two hours. Skin breakdown noted to coccyx and scrotum  Close monitoring of glucose. Trended down over night.  D5.045 star

## 2020-10-18 NOTE — PLAN OF CARE
Received patient post shift report with patient resting in bed. Patient alert and oriented to self, answering some questions (difficult to understand at times), and at times following commands. On room air, saturations WNL.  Congested non-productive cough r

## 2020-10-18 NOTE — PROGRESS NOTES
[facility] Pharmacy Note:  Discontinuation of Stress Ulcer Prophylaxis     Vivi Jacob is a 80year old patient who was initiated on stress ulcer prophylaxis with famotidine (PEPCID). The patient no longer meets criteria for stress ulcer prophylaxis.   It has

## 2020-10-18 NOTE — PROGRESS NOTES
SPEECH PATHOLOGY NOTE:    Dysphagia treatment attempted as per plan of care. However, patient sleeping soundly and difficult to rouse at this time. SLP following, will re-attempt at later time.     Kevin Grullon, EDGARD.D, [address] [facility]  Speech-Language Pathologist

## 2020-10-18 NOTE — CM/SW NOTE
Referrals sent for [facility] with orders in [place] for review. Will follow.  Pt w/hx Preferred Providers [facility]. [phone]      Mercedes Bernard RN, MSN, APN, CTL   Clinical Transitions Leader  [phone]    Addendum 10/18/2020 3:11 PM :  Reserved in [employer] [place] [employer]

## 2020-10-18 NOTE — PHYSICAL THERAPY NOTE
Order rec'd. Chart thoroughly reviewed and called Zabrina Spring - sister, to verify reported function in notes. Maximiliano Herman reports that pt rec'd 8 weeks of HHPT after admit in April 2020, but that pt did not make any progress toward ambulation.  She state

## 2020-10-18 NOTE — PLAN OF CARE
1800- Pt very lethargic so mitts removed. In view of nurse's station and will closely monitor. Pt transferred from ICU today. IVf infusing into TLC. Dsng is C/D/I. Wright patent. Incontinent of bowel. Small open blister area on scrotum.   Transferred status  - Initiate measures to prevent increased intracranial pressure  - Maintain blood pressure and fluid volume within ordered parameters to optimize cerebral perfusion and minimize risk of hemorrhage  - Monitor temperature, glucose, and sodium.  Initiat

## 2020-10-18 NOTE — PROGRESS NOTES
[facility] Patient Status:  Inpatient    10/7/1935 MRN [id number]   [facility] 4SW-A Attending Parviz Lux MD   [facility] Day # 6 PCP PHYSICIAN NONSTAFF     Critical Care Progress Note     Date of Admission: 10/12/2020 PRN  •  Metoclopramide HCl (REGLAN) injection 5 mg, 5 mg, Intravenous, Q8H PRN  •  vasopressin (PITRESSIN) 20 Units in sodium chloride 0.9% 50 mL infusion for shock, 0.01-0.04 Units/min, Intravenous, Continuous  •  fentaNYL citrate (SUBLIMAZE) 0.05 MG/ML i 15809.5 [I.V.:8683.5; VK/CN:6009; IV PIGGYBACK:1150]  Out: 5300 [Urine:4980; Emesis/NG output:320]  I/O this shift:  In: 2781.1 [P.O.:120;  I.V.:2361.1; IV PIGGYBACK:300]  Out: 1000 [Urine:1000]      Physical Exam:                          General: awake, c w/ NURF.  -IVF, wean pressors as able  -IV antibiotics: Zosyn (started 10/12), completed azithro  -wean stress dose steroids, will decrease to daily starting tomorrow   -f/u cultures, adjust abx as indicated  3.  Anemia - hgb dropping, no obvious bleeding

## 2020-10-18 NOTE — PROGRESS NOTES
Received patient alert to self only  No complaints of pain  NSR on monitor  Lungs diminished bilaterally  Soft mittens in place for safety  Meds given with applesauce,pt takes very small bites and needs multiple prompts to swallow.     IVF per [facility]  Turned Q

## 2020-10-18 NOTE — PROGRESS NOTES
[facility]  Progress Note     Chris Muhammad Patient Status:  Inpatient    10/7/1935 MRN [id number]   [facility] 4SW-A Attending Randall Gomez, DO   Hosp Day # 6 PCP PHYSICIAN NONSTAFF     Chief Complaint: Septic Shock    S: Patient 24 26   BILT 0.5  --  0.4 0.5   TP 5.3*  --  5.3* 5.3*       Estimated Creatinine Clearance: 76.2 mL/min (based on SCr of 0.74 mg/dL). Recent Labs   Lab 10/12/20  1902   PTP 13.8   INR 1.03       No results for input(s): TROP, CK in the last 168 hours.

## 2020-10-19 NOTE — PLAN OF CARE
Problem: Impaired Swallowing  Goal: Minimize aspiration risk  Description: Interventions:  Downgrade to NPO with clinical reassessment rec'd  Consider providing conservative amounts of small ice chips for comfort when awake/alert and demonstrating signs

## 2020-10-19 NOTE — PROGRESS NOTES
Pulmonary Progress Note        NAME: Vivi Ma - ROOM: [id number] - MRN: [id number] - Age: 80year old - : 10/7/1935        Last 24hrs: No events overnight, minimally responsive today, grimaces to pain, attempts to open eyes    OBJECTIVE:   10/18/20 setting of pneumonia, septic shock  -extubated 10/16  -resolved   2. Mucus plugging  -exacerbated by AMS  -no longer requiring frequent suctioning  3.  Septic shock - secondary to pneumonia. resolved  -IV antibiotics: Zosyn (started 10/12), completed azithr

## 2020-10-19 NOTE — CM/SW NOTE
Care Progression Note:  Active Acute Medical Issue:   Sepsis due to pneumonia [facility])     Other Contributing Medical Factors/Dx. :    Length of stay: 7  Avoidable Delays:   Discharge Barriers: per sister, POA, wishes to continue work up until a final decision

## 2020-10-19 NOTE — SLP NOTE
SPEECH DAILY NOTE - INPATIENT    ASSESSMENT & PLAN   ASSESSMENT  Reassessment of dysphagia ordered due to concern for reduced mental status, not waking or level of alertness appropriate for meals or PO or oral meds.   Pt sleeping upon SLP arrival.  Pt woke food/drink.   Recommendations: recommend providing oral care prior to offering ice chips to ensure optimal safety with conservative trials  Medication Administration Recommendations: Non-oral    Discharge Recommendations  Discharge Recommendations/Plan: Und

## 2020-10-19 NOTE — DIETARY NOTE
[facility]    NUTRITION FOLLOW UP ASSESSMENT    NUTRITION DIAGNOSIS/PROBLEM:    Inadequate energy intake related to decreased ability to consume sufficient energy intake as evidenced by poor oral intake and AMS.  - updated, continues -    Unintentional dementia, DM2, HTN, DL, gout. ANTHROPOMETRICS:  Ht:  6' 3\"  Wt: 78.2 kg (172 lb 4.8 oz). This is 74% of IBW  BMI: Body mass index is 21.54 kg/m².   IBW: 89 kg    WEIGHT HISTORY: If wts are accurate per EMR, pt has lost about 37 lb (20.3%) in about 6 mon

## 2020-10-19 NOTE — CM/SW NOTE
MSW acknowledged order for hospice. MSW paged [facility] with informational meeting order.     Cynthia Alonzo LCSW

## 2020-10-19 NOTE — CM/SW NOTE
MSW was provided report during rounds that there is a discrepancy about who is the patient's legal decision maker. Patient has been reported to be alert and oriented x1 and bed bound at home. Sister cares for the patient. MSW reviewed chart.   Power of A

## 2020-10-19 NOTE — CM/SW NOTE
SW received referral for hospice. Waqar placed.  Sister Deanna Edwards wanted to meet Wednesday, however, sw requested to meet Tuesday and she could only meet at 3pm.

## 2020-10-19 NOTE — PLAN OF CARE
Patient alert to self. Arousable to voice. RA, maintaining normal saturations. Lung sounds are diminished with crackles. NSR on tele. SCD's on. Edema to lower extremities, and scrotal edema noted. Wright catheter in place, draining dark yellow urine.  Incont METABOLIC/FLUID AND ELECTROLYTES - ADULT  Goal: Hemodynamic stability and optimal renal function maintained  Description: INTERVENTIONS:  - Monitor labs and assess for signs and symptoms of volume excess or deficit  - Monitor intake, output and patient trenton

## 2020-10-19 NOTE — PROGRESS NOTES
[address] [facility] [address] Follow Up     Son Galindo  [id number]  Hospital Day #7  Date of Consult: 10/13/20  Patient seen at: [facility]     Subjective:      Patient was seen and examined without family at the bedside.     Vivi ennis (TYLENOL) 650 MG rectal suppository 650 mg, 650 mg, Rectal, Q6H PRN  •  docusate sodium (COLACE) liquid 100 mg, 100 mg, Oral, BID  •  PEG 3350 (MIRALAX) powder packet 17 g, 17 g, Oral, Daily PRN  •  magnesium hydroxide (MILK OF MAGNESIA) 400 MG/5ML suspens OR HEAD (76223), 4/08/2020, 0:48 AM.     INDICATIONS:  AMS     TECHNIQUE:  Noncontrast CT scanning is performed through the brain. Dose reduction techniques were used.  Dose information is transmitted to the [facility]) Fausto Cote [address] Chanda Ferrara non-distended. Neurologic: drowsy, not oriented to person, place and time   Psychiatric: Mood - calm  Skin: Warm and dry.     Palliative Performance Scale: 20%    Palliative Performance Scale   % Ambulation Activity Level Self-Care Intake Consciousness   1 treatment.      HCPOA:        Healthcare Agent Appointed: Yes  Healthcare Agent's Name: Via Dayne Tejeda [employer] Agent's Phone Number: [phone]        Disposition: ongoing goals of care discussions      Problem List       Sepsis due to pneumonia [facility]

## 2020-10-19 NOTE — PROGRESS NOTES
[facility]  Progress Note     Deon Sevilla Patient Status:  Inpatient    10/7/1935 MRN [id number]   [facility] 4SW-A Attending Vikash Church, DO   Hosp Day # 7 PCP PHYSICIAN NONSTAFF     Chief Complaint: Septic Shock    S: Patient ALKPHO 107  --  104 108  --    AST 25  --  22 26  --    ALT 23  --  24 26  --    BILT 0.5  --  0.4 0.5  --    TP 5.3*  --  5.3* 5.3*  --     < > = values in this interval not displayed.        Estimated Creatinine Clearance: 86.6 mL/min (A) (based on SCr discussion with the patient's sister, he currently lives with her. The patient has had a progressive decline since August of last year. He at times does not remember Janna. He is interactive at times at home however it is completely bedbound.   I re

## 2020-10-20 NOTE — PALLIATIVE CARE NOTE
Family meeting with hospice today, appropriate next step. Angel Box is aware palliative care is available if necessary.

## 2020-10-20 NOTE — PLAN OF CARE
Non verbally responsive,lethargic  No respiratory distress, on room air 100%  All po meds held today, c/o cough with puree diet, coates intact, TLC right IJ IV infusing, IV Zosyn for PNA  K rider given for K+ of 3.3  DNAR  Family and Hospice meeting on [address]

## 2020-10-20 NOTE — PLAN OF CARE
Assumed care of pt @2330. Pt alert, disoriented x4. When asked how he felt he said \"great\". Most speech is mumbled/incomprehensible. Pt does not follow commands and has generalized weakness. Sinus rhythm on tele. Scds on. Pt has generalized edema.  Lungs indicated  - Manage/alleviate anxiety  - Monitor for signs/symptoms of CO2 retention  Outcome: Progressing     Problem: Impaired Cognition  Goal: Patient will exhibit improved attention, thought processing and/or memory  Description: Interventions:     Yolie Grover

## 2020-10-20 NOTE — PROGRESS NOTES
Pulmonary Progress Note        NAME: Vivi Mendoza - ROOM: [facility]7616-K - MRN: [id number] - Age: 80year old - : 10/7/1935        Last 24hrs: No events overnight, will open eyes, does not follow commands, is able to cough up some phlegm    OBJECTIVE:   10 pneumonia. resolved  -IV antibiotics: Zosyn (started 10/12), completed azithro  -off stress dose steroids  3.  Neuro - encephalopathy, secondary to sepsis and underlying dementia  -supportive care, per sister and POA, pt does not always follow commands at b

## 2020-10-20 NOTE — CM/SW NOTE
RAKESH met with pt , sister and niece. Discussed hospice services and goals of care . Plan for pt to go home with hospice in the next day or 2 depending on eq uipment  from [facility]. Will plan as equipment can be set up.

## 2020-10-20 NOTE — PROGRESS NOTES
[facility]  Progress Note     Hazel Tena Patient Status:  Inpatient    10/7/1935 MRN [id number]   [facility] 4SW-A Attending Alice Carbajal,    Hosp Day # 8 PCP PHYSICIAN NONSTAFF     Chief Complaint: Septic Shock    S: Patient PTP, INR in the last 168 hours. No results for input(s): TROP, CK in the last 168 hours. Imaging: Imaging data reviewed in Epic.     Medications:   • potassium chloride  40 mEq Intravenous Once   • hydrocortisone Na succinate PF  50 mg Intravenou

## 2020-10-20 NOTE — SLP NOTE
Patient with con't decreased alertness and not appropriate for PO intake. Recent Pt/POA/family meeting for eventual d/c with hospice care. SLP to sign off at this time.    Ross Richards MS CCC-SLP/L, pager [phone]  Speech-LanguagePathologist

## 2020-10-21 NOTE — PROGRESS NOTES
Pt received at 1500. Pt remains confused and disoriented- to include recognizing family. Drifts in and out. Care conference this afternoon. Pt currently NPO- unable to tolerate pureed diet. Plan for hospice transition on discharge.  To convene with travis

## 2020-10-21 NOTE — DIETARY NOTE
[facility]    NUTRITION FOLLOW UP ASSESSMENT    NUTRITION DIAGNOSIS/PROBLEM:    Inadequate energy intake related to decreased ability to consume sufficient energy intake as evidenced by poor oral intake and AMS.  - updated, continues -    Unintentional and cough. Received nutrition consult for tube feeds. See TF recs above. Pt intubated on 10/12 due to PNA and septic shock. Receiving propofol at 1.9 ml/hr, which provides: 50 kcal. Pt noted to have poor appetite PTA. Palliative care on consult.    PMH: dem LABS:  noted    Pt is at low nutrition risk    FOLLOW-UP DATE: 10/28    Ryland Aquino RD, LDN, [address]   Clinical Dietitian  [phone] pager #

## 2020-10-21 NOTE — PLAN OF CARE
Patient received at 07:30 in bed. Patient largely lethargic/stuporous - attempts to follow commands at times. Declines to open his eyes. K+ replaced via IV per electrolyte protocol. Remains on IV abx for pneumonia.  Patient continues to have congested cough Assess the need for suctioning and perform as needed  - Assess and instruct to report SOB or any respiratory difficulty  - Respiratory Therapy support as indicated  - Manage/alleviate anxiety  - Monitor for signs/symptoms of CO2 retention  Outcome: Seferino

## 2020-10-21 NOTE — PLAN OF CARE
Assumed care of pt @2330. Pt lethargic, occ mumbles otherwise non verbal tonight. Sinus rhythm on tele. Lungs with rhonchi, occ productive cough. Thick white sputum. Oral suctioning prn. Wright intact draining sonido urine. IVF infusing to right neck TLC.  Sc ADULT  Goal: Hemodynamic stability and optimal renal function maintained  Description: INTERVENTIONS:  - Monitor labs and assess for signs and symptoms of volume excess or deficit  - Monitor intake, output and patient weight  - Monitor urine specific Philomena Cooler

## 2020-10-21 NOTE — CM/SW NOTE
Spoke with [facility] medical who is picking up equipment tonight between 2-6pm. Hospice will deliver equipment tomorrow morning and will discharge between 12-1 tomorrow . Spoke with sister nasima who is in agreement.

## 2020-10-21 NOTE — PROGRESS NOTES
[facility]  Progress Note     Tyler Small Patient Status:  Inpatient    10/7/1935 MRN [id number]   [facility] 4SW-A Attending Anselmo Renner, DO   Hosp Day # 9 PCP PHYSICIAN NONSTAFF     Chief Complaint: Septic Shock    S: drowsy, --    TP 5.3* 5.3*  --  4.8*  --        Estimated Creatinine Clearance: 75 mL/min (A) (based on SCr of 0.66 mg/dL (L)). No results for input(s): PTP, INR in the last 168 hours. No results for input(s): TROP, CK in the last 168 hours.          Imaging:

## 2020-10-22 NOTE — PLAN OF CARE
Patient is in clinitiron bed. He occasionally talk but can not follow command. Oral suction and mouth swab was done.        Problem: Delirium  Goal: Minimize duration of delirium  Description: Interventions:  - Encourage use of hearing aids, eye glasses  - Progressing     Problem: METABOLIC/FLUID AND ELECTROLYTES - ADULT  Goal: Hemodynamic stability and optimal renal function maintained  Description: INTERVENTIONS:  - Monitor labs and assess for signs and symptoms of volume excess or deficit  - Monitor intak

## 2020-10-22 NOTE — PLAN OF CARE
Received patient at 0730. Patient drowsy and lethargic. Able to follow commands, but delayed response. Tele Rhythm NSR. O2 Saturation 100%. On RA. Breath sounds diffused rhonchi. Oral suctioned with suction swab.  Meds given with applesauce and sips of n applicable  - Encourage broncho-pulmonary hygiene including cough, deep breathe, Incentive Spirometry  - Assess the need for suctioning and perform as needed  - Assess and instruct to report SOB or any respiratory difficulty  - Respiratory Therapy support of food and small sips of liquid  - Offer pills one at a time, crush or deliver with applesauce as needed  - Discontinue feeding and notify MD (or speech pathologist) if coughing or persistent throat clearing or wet/gurgly vocal quality is noted  Outcome:

## 2020-10-22 NOTE — CM/SW NOTE
RAKESH finalizing details for discharge. Ambulance coming at 12:15. Hospice admission upon arrival home.

## 2020-10-22 NOTE — DISCHARGE SUMMARY
[facility]  DISCHARGE SUMMARY     [facility] Patient Status:  Inpatient    10/7/1935 MRN [id number]   [facility] 8NE-A Attending Michael Carrillo DO   Hosp Day # 10 PCP PHYSICIAN NONSTAFF     Date of Admission: 10/12/2020  Date of patient had been functionally declining at home for a while. His dementia had progressed. Patient's family met with hospice and they wish to enroll him in hospice care at home. He was discharged home with hospice care in stable condition.     Procedures Arthritis Pain 650 MG Tbcr  Generic drug: Acetaminophen ER      Take 650 mg by mouth every 8 (eight) hours as needed for Pain. Refills: 0     Vitamin B-1 100 MG Tabs  Commonly known as: VITAMIN B1      Take 100 mg by mouth daily.    Refills: 0     Vitamin

## 2020-10-22 NOTE — PLAN OF CARE
Pt is OK to discharge per primary and consults. Pt to go home under hospice care. Spoken with hospice nurse, Baldemar Flores this am to confirm. tele monitor discontinued. All belongings taken with pt. Pt transported off unit via stretcher.

## 2022-11-16 NOTE — IP AVS SNAPSHOT
[address]            (For Outpatient Use Only) Initial Admit Date: 4/7/2020   Inpt/Obs Admit Date: Inpt: N/A / Obs: 04/08/20   Discharge Date:    Huel Curling:  [de-identified]   MRN: [de-identified]   CSN: [id number]   CEID: [id number] Group Number:  Insurance Type:    Subscriber Name:  Subscriber :    Subscriber ID:  Pt Rel to Subscriber:    Hospital Account Financial Class: Medicare    2020 Metronidazole Pregnancy And Lactation Text: This medication is Pregnancy Category B and considered safe during pregnancy.  It is also excreted in breast milk.

## (undated) NOTE — IP AVS SNAPSHOT
Patient Demographics     Address  [address] [place] [address] Phone  [phone] [facility])  [phone] (Mobile) *Preferred* E-mail Address  [email or web address]      Emergency Contact(s)     Name Relation Home Work Mobile    WE [    ]   [    ]   [    ]     OSTEO BI-FLEX JOINT SHIELD OR      Take 1 tablet by mouth daily. [    ]   [    ]   [    ]   [    ]     traMADol HCl 50 MG Tabs  Commonly known as: ULTRAM      Take 50 mg by mouth every 6 (six) hours as needed for Pain.     [ [id number] escitalopram (LEXAPRO) tablet 10 mg 10/22/20 1107 Given      [id number] potassium chloride IVPB premix 40 mEq 10/22/20 1021 New Bag              Recent Vital Signs       Most Recent Value   Vitals  136/65 Filed at 10/22/2020 0809   Pulse  59 File Order Status: Completed Lab Status: Final result Updated: 10/16/20 0926    Specimen: Stool      Occult Blood Result Negative for Occult Blood    Sputum culture Once [[id number]] Collected: 10/12/20 1613    Order Status: Completed Lab Status: Final result U History of Present Illness: Vivi Mcdermott is a 80year old male with[DK.1] past medical history significant for DMII, HTN, DL, gout presented to the ER with c/o difficulty breathing and worsening mental status.  According to the patient's family , pt has be •  Misc Natural Products (OSTEO BI-FLEX JOINT SHIELD OR), Take 1 tablet by mouth daily. , Disp: , Rfl:     •  TraMADol HCl 50 MG Oral Tab, Take 50 mg by mouth every 6 (six) hours as needed for Pain., Disp: , Rfl:     •  Donepezil HCl 5 MG Oral Tab, Take 5 m Integument: No rashes or lesions. Psychiatric: Appropriate mood and affect.       Diagnostic Data:      Labs:  Recent Labs   Lab 10/12/20  1358   WBC 5.0   HGB 9.6*   MCV 96.8   .0*       Recent Labs   Lab 10/12/20  1358   *   BUN 37*   CREA Filed: 10/12/2020  5:53 PM Date of Service: 10/12/2020  5:15 PM Status: Signed    : Katherin Bazzi MD (Physician)     Consult Orders    1.  ED Consult to Pulmonary/Critical Care [[id number]] ordered by Monica López MD at 10/12/20 2960 Sig: Take 50 mg by mouth every 6 (six) hours as needed for Pain. Vitamin B-1 100 MG Oral Tab   Yes No   Sig: Take 100 mg by mouth daily. Vitamin B-12 500 MCG Oral Tab   Yes No   Sig: Take 500 mcg by mouth daily.    allopurinol 300 MG Oral Tab   Yes No Days per week: Not on file        Minutes per session: Not on file      Stress: Not on file    Relationships      Social connections        Talks on phone: Not on file        Gets together: Not on file        Attends [employer] service: Not on file *   ALB 2.5*   ALKPHO 107   AST 36   ALT 39   BILT 0.4   TP 6.0*       Imaging reviewed. ASSESSMENT AND PLAN     1.  Acute hypoxemia and hypercapnic respiratory failure - intubated today in the setting of pneumonia, septic shock  -continue full ve Physical Therapy Notes (last 72 hours) (Notes from 10/19/2020 12:52 PM through 10/22/2020 12:52 PM)    No notes of this type exist for this encounter.      Occupational Therapy Notes (last 72 hours) (Notes from 10/19/2020 12:52 PM through 10/22/2020 12:52 P Acute respiratory failure (HCC)    Septic shock (HCC)    Hypokalemia    Anemia    Acute kidney injury ([address]    Hyperglycemia    Symptomatic bradycardia    Acute respiratory failure, unspecified whether with hypoxia or hypercapnia ([address]    Sepsis with hyp Oral Phase of Swallow (VFSS - Puree):  Within Functional Limits  Residue Severity, Location: Mild/Mod;Valleculae  Laryngeal Penetration: None  Tracheal Aspiration: None        Penetration Aspiration Scale Score: Score 1: Material does not enter airway SLP Note - SLP Notes      SLP Note signed by EDGARD Vick at 10/20/2020  3:58 PM  Version 1 of 1    Author: EDGARD Vick Service: Rehab Author Type: Speech Pathologist    Filed: 10/20/2020  3:58 PM Date of Service: 10/20/202

## (undated) NOTE — LETTER
[facility]     I agree to have a Peripherally Inserted Central Catheter (PICC) placed in my arm.    1. The PICC insertion procedure, care, maintenance, risks, benefits, and complications have been explained to me by my physic 7. The person performing this procedure has discussed the potential benefits, risks, and side effects of the PICC; the likelihood of achieving goals; and potential problems that might occur during recuperation.  They also discussed reasonable alternatives t

## (undated) NOTE — ED AVS SNAPSHOT
Dottie Godfrey   MRN: [id number]    Department:  [facility] Emergency Department   Date of Visit:  11/6/2019           Disclosure     Insurance plans vary and the physician(s) referred by the ER may not be covered by your plan.  Please contact your tell this physician (or your personal doctor if your instructions are to return to your personal doctor) about any new or lasting problems. The primary care or specialist physician will see patients referred from the [facility] Emergency Department.  Aman Jean

## (undated) NOTE — ED AVS SNAPSHOT
Tapangene Mirelesshira   MRN: [id number]    Department:  [facility] Emergency Department   Date of Visit:  7/30/2019           Disclosure     Insurance plans vary and the physician(s) referred by the ER may not be covered by your plan.  Please contact your tell this physician (or your personal doctor if your instructions are to return to your personal doctor) about any new or lasting problems. The primary care or specialist physician will see patients referred from the [facility] Emergency Department.  Salvadore Skiff

## (undated) NOTE — IP AVS SNAPSHOT
[address]            (For Outpatient Use Only) Initial Admit Date: 12/11/2019   Inpt/Obs Admit Date: Inpt: 12/11/19 / Obs: N/A   Discharge Date:    Naga Tolentino:  [de-identified]   MRN: [de-identified]   CSN: [id number]   CEID: [id number] Subscriber ID:  Pt Rel to Subscriber:    Hospital Account Financial Class: Medicare    December 16, 2019

## (undated) NOTE — IP AVS SNAPSHOT
[address]            (For Outpatient Use Only) Initial Admit Date: 10/12/2020   Inpt/Obs Admit Date: Inpt: 10/12/20 / Obs: N/A   Discharge Date:    Jyothi Bullard:  [de-identified]   MRN: [de-identified]   CSN: [id number]   CEID: [id number] Subscriber ID: [id number] Pt Rel to Subscriber: SELF   TERTIARY INSURANCE   Payor:  Plan:    Group Number:  Insurance Type:    Subscriber Name:  Subscriber :    Subscriber ID:  Pt Rel to Subscriber:    Hospital Account Financial Class: Medicare    [place]

## (undated) NOTE — IP AVS SNAPSHOT
Patient Demographics     Address  [address] [place] [address] Phone  [phone] [facility])  [phone] [facility]      Emergency Contact(s)     Name Relation Home Work [address] Sister   [phone]    Supriya Reis Relative   [id number]- traZODone HCl 50 MG Tabs  Commonly known as:  DESYREL      Take 50 mg at night as needed for insomnia          TYLENOL 8 HOUR ARTHRITIS PAIN 650 MG Tbcr  Generic drug:  Acetaminophen ER      Take 650 mg by mouth every 8 (eight) hours as needed for Pain. SpO2  91 % Filed at 12/16/2019 0542      Patient's Most Recent Weight       Most Recent Value   Patient Weight  82.7 kg (182 lb 6.4 oz)      Lab Results Last 24 Hours    No matching results found     Microbiology Results (All)     Procedure Component Value Influenza B PCR: Negative     Parainfluenza 1 PCR: Negative     Parainlfuenza 2 PCR Negative     Parainlfuenza 3 PCR Negative     Parainlfuenza 4 PCR Negative     Resp Syncytial Virus PCR Negative     Bordetella Pertussis PCR Negative     Chlamydia pneum • Diabetes ([facility]    • DM (diabetes mellitus) ([facility]    • Gout    • High blood pressure    • High cholesterol    • HTN (hypertension)    • Hypercholesterolemia    • Muscle weakness    • Visual impairment         Past Surgical History: History reviewed.  No pe A comprehensive 14 point review of systems was completed. Pertinent positives and negatives noted in the HPI.     Physical Exam:    /59   Pulse 76   Temp 97.3 °F (36.3 °C) (Temporal)   Resp 18   Ht 6' 3\" (1.905 m)   Wt 198 lb (89.8 kg)   SpO2 98% Plan of care discussed with pt, pt family    Paulo Gonzalez MD  12/11/2019[GS. 1]                        Electronically signed by Lidia Lemus MD on 12/11/2019  6:58 PM   Attribution Key    GS. 1 - Lidia Lemus MD on 12/11/2019  6:54 PM • Muscle weakness    • Visual impairment        Past Surgical History  History reviewed. No pertinent surgical history. HOME SITUATION  Type of Home: House   Home Layout: One level  Stairs to Enter : 2  Railing: No          Lives With: Family; Other (Com ADDITIONAL TESTS                                    NEUROLOGICAL FINDINGS  Neurological Findings: Sensation           Sensation: BLE symmetrical/intact to light touch       ACTIVITY TOLERANCE                         O2 WALK                  AM-PAC '6-Click Posture  Transfer training    Patient End of Session: In bed;Needs met;Call light within reach;RN aware of session/findings; All patient questions and concerns addressed; Discussed recommendations with /; Other (Comment)(posey intact) CURRENT GOALS    Goal #1 Patient is able to demonstrate supine - sit EOB @ level: minimum assistance     Goal #2 Patient is able to demonstrate transfers Sit to/from Stand at assistance level: minimum assistance     Goal #3 Patient is able to ambulate 10 • Muscle weakness    • Visual impairment        Past Surgical History  History reviewed. No pertinent surgical history. OCCUPATIONAL PROFILE    HOME SITUATION  Type of Home: House  Home Layout: One level  Lives With: Family; Other (Comment)(c sister, elizabeth WFL[MR.1] (able to open food items and manage utensils WFL)[MR.2]     ADDITIONAL TESTS        NEUROLOGICAL FINDINGS  Neurological Findings: None        ACTIVITY TOLERANCE       O2 SATURATIONS[MR.1]      RA, no SOB[MR.2]    ACTIVITIES OF DAILY LIVING ASSE Patient is a 80year old male admitted on 12/11/2019 for[MR.1] nausea, emesis and diarrhea[MR.2]. Complete medical history and occupational profile noted above.  Functional outcome measures completed include[MR.1] : AM- PAC, pt with 63.03% impairment in bas OT Device Recommendations: None    PLAN  OT Treatment Plan: Balance activities; ADL training;Energy conservation/work simplification techniques; Functional transfer training; Endurance training;Patient/Family education;Equipment eval/education; Compensatory te